# Patient Record
Sex: MALE | Race: WHITE | NOT HISPANIC OR LATINO | Employment: OTHER | URBAN - METROPOLITAN AREA
[De-identification: names, ages, dates, MRNs, and addresses within clinical notes are randomized per-mention and may not be internally consistent; named-entity substitution may affect disease eponyms.]

---

## 2020-07-06 ENCOUNTER — HOSPITAL ENCOUNTER (OUTPATIENT)
Facility: CLINIC | Age: 85
Setting detail: OBSERVATION
Discharge: HOME | End: 2020-07-08
Attending: EMERGENCY MEDICINE | Admitting: HOSPITALIST
Payer: COMMERCIAL

## 2020-07-06 ENCOUNTER — APPOINTMENT (OUTPATIENT)
Dept: GENERAL RADIOLOGY | Facility: CLINIC | Age: 85
End: 2020-07-06
Attending: EMERGENCY MEDICINE
Payer: COMMERCIAL

## 2020-07-06 DIAGNOSIS — R06.09 DYSPNEA ON EXERTION: ICD-10-CM

## 2020-07-06 DIAGNOSIS — I25.10 CORONARY ARTERY DISEASE INVOLVING NATIVE HEART WITHOUT ANGINA PECTORIS, UNSPECIFIED VESSEL OR LESION TYPE: Primary | ICD-10-CM

## 2020-07-06 DIAGNOSIS — R07.9 CHEST PAIN ON EXERTION: ICD-10-CM

## 2020-07-06 DIAGNOSIS — R60.0 BILATERAL LEG EDEMA: ICD-10-CM

## 2020-07-06 DIAGNOSIS — K21.9 GASTROESOPHAGEAL REFLUX DISEASE WITHOUT ESOPHAGITIS: ICD-10-CM

## 2020-07-06 LAB
ANION GAP SERPL CALCULATED.3IONS-SCNC: 5 MMOL/L (ref 3–14)
BASOPHILS # BLD AUTO: 0 10E9/L (ref 0–0.2)
BASOPHILS NFR BLD AUTO: 0.2 %
BUN SERPL-MCNC: 15 MG/DL (ref 7–30)
CALCIUM SERPL-MCNC: 8.2 MG/DL (ref 8.5–10.1)
CHLORIDE SERPL-SCNC: 109 MMOL/L (ref 94–109)
CO2 SERPL-SCNC: 27 MMOL/L (ref 20–32)
CREAT SERPL-MCNC: 0.88 MG/DL (ref 0.66–1.25)
D DIMER PPP FEU-MCNC: 0.3 UG/ML FEU (ref 0–0.5)
DIFFERENTIAL METHOD BLD: ABNORMAL
EOSINOPHIL # BLD AUTO: 0.2 10E9/L (ref 0–0.7)
EOSINOPHIL NFR BLD AUTO: 1.7 %
ERYTHROCYTE [DISTWIDTH] IN BLOOD BY AUTOMATED COUNT: 13 % (ref 10–15)
GFR SERPL CREATININE-BSD FRML MDRD: 78 ML/MIN/{1.73_M2}
GLUCOSE SERPL-MCNC: 90 MG/DL (ref 70–99)
HBA1C MFR BLD: 5.4 % (ref 0–5.6)
HCT VFR BLD AUTO: 42 % (ref 40–53)
HGB BLD-MCNC: 13.2 G/DL (ref 13.3–17.7)
IMM GRANULOCYTES # BLD: 0 10E9/L (ref 0–0.4)
IMM GRANULOCYTES NFR BLD: 0.2 %
LYMPHOCYTES # BLD AUTO: 1.2 10E9/L (ref 0.8–5.3)
LYMPHOCYTES NFR BLD AUTO: 13.8 %
MCH RBC QN AUTO: 29.6 PG (ref 26.5–33)
MCHC RBC AUTO-ENTMCNC: 31.4 G/DL (ref 31.5–36.5)
MCV RBC AUTO: 94 FL (ref 78–100)
MONOCYTES # BLD AUTO: 1.1 10E9/L (ref 0–1.3)
MONOCYTES NFR BLD AUTO: 12.2 %
NEUTROPHILS # BLD AUTO: 6.5 10E9/L (ref 1.6–8.3)
NEUTROPHILS NFR BLD AUTO: 71.9 %
NRBC # BLD AUTO: 0 10*3/UL
NRBC BLD AUTO-RTO: 0 /100
NT-PROBNP SERPL-MCNC: 222 PG/ML (ref 0–1800)
PLATELET # BLD AUTO: 167 10E9/L (ref 150–450)
POTASSIUM SERPL-SCNC: 4 MMOL/L (ref 3.4–5.3)
RBC # BLD AUTO: 4.46 10E12/L (ref 4.4–5.9)
SODIUM SERPL-SCNC: 141 MMOL/L (ref 133–144)
TROPONIN I SERPL-MCNC: <0.015 UG/L (ref 0–0.04)
TROPONIN I SERPL-MCNC: <0.015 UG/L (ref 0–0.04)
WBC # BLD AUTO: 9 10E9/L (ref 4–11)

## 2020-07-06 PROCEDURE — 80048 BASIC METABOLIC PNL TOTAL CA: CPT | Performed by: EMERGENCY MEDICINE

## 2020-07-06 PROCEDURE — 85025 COMPLETE CBC W/AUTO DIFF WBC: CPT | Performed by: EMERGENCY MEDICINE

## 2020-07-06 PROCEDURE — 99220 ZZC INITIAL OBSERVATION CARE,LEVL III: CPT | Performed by: PHYSICIAN ASSISTANT

## 2020-07-06 PROCEDURE — 84484 ASSAY OF TROPONIN QUANT: CPT | Performed by: EMERGENCY MEDICINE

## 2020-07-06 PROCEDURE — C9803 HOPD COVID-19 SPEC COLLECT: HCPCS

## 2020-07-06 PROCEDURE — 85379 FIBRIN DEGRADATION QUANT: CPT | Performed by: EMERGENCY MEDICINE

## 2020-07-06 PROCEDURE — U0003 INFECTIOUS AGENT DETECTION BY NUCLEIC ACID (DNA OR RNA); SEVERE ACUTE RESPIRATORY SYNDROME CORONAVIRUS 2 (SARS-COV-2) (CORONAVIRUS DISEASE [COVID-19]), AMPLIFIED PROBE TECHNIQUE, MAKING USE OF HIGH THROUGHPUT TECHNOLOGIES AS DESCRIBED BY CMS-2020-01-R: HCPCS | Performed by: EMERGENCY MEDICINE

## 2020-07-06 PROCEDURE — 93005 ELECTROCARDIOGRAM TRACING: CPT

## 2020-07-06 PROCEDURE — 83036 HEMOGLOBIN GLYCOSYLATED A1C: CPT | Performed by: EMERGENCY MEDICINE

## 2020-07-06 PROCEDURE — 71046 X-RAY EXAM CHEST 2 VIEWS: CPT

## 2020-07-06 PROCEDURE — 36415 COLL VENOUS BLD VENIPUNCTURE: CPT | Performed by: PHYSICIAN ASSISTANT

## 2020-07-06 PROCEDURE — 25000128 H RX IP 250 OP 636: Performed by: PHYSICIAN ASSISTANT

## 2020-07-06 PROCEDURE — 84484 ASSAY OF TROPONIN QUANT: CPT | Mod: 91 | Performed by: PHYSICIAN ASSISTANT

## 2020-07-06 PROCEDURE — 25000132 ZZH RX MED GY IP 250 OP 250 PS 637: Performed by: PHYSICIAN ASSISTANT

## 2020-07-06 PROCEDURE — 99285 EMERGENCY DEPT VISIT HI MDM: CPT | Mod: 25

## 2020-07-06 PROCEDURE — 83880 ASSAY OF NATRIURETIC PEPTIDE: CPT | Performed by: EMERGENCY MEDICINE

## 2020-07-06 PROCEDURE — 83036 HEMOGLOBIN GLYCOSYLATED A1C: CPT | Performed by: PHYSICIAN ASSISTANT

## 2020-07-06 PROCEDURE — 96374 THER/PROPH/DIAG INJ IV PUSH: CPT

## 2020-07-06 PROCEDURE — G0378 HOSPITAL OBSERVATION PER HR: HCPCS

## 2020-07-06 RX ORDER — ATORVASTATIN CALCIUM 80 MG/1
80 TABLET, FILM COATED ORAL AT BEDTIME
COMMUNITY
End: 2021-03-01

## 2020-07-06 RX ORDER — ACETAMINOPHEN 325 MG/1
650 TABLET ORAL EVERY 4 HOURS PRN
Status: DISCONTINUED | OUTPATIENT
Start: 2020-07-06 | End: 2020-07-06

## 2020-07-06 RX ORDER — ALUMINA, MAGNESIA, AND SIMETHICONE 2400; 2400; 240 MG/30ML; MG/30ML; MG/30ML
30 SUSPENSION ORAL EVERY 4 HOURS PRN
Status: DISCONTINUED | OUTPATIENT
Start: 2020-07-06 | End: 2020-07-08 | Stop reason: HOSPADM

## 2020-07-06 RX ORDER — ACETAMINOPHEN 325 MG/1
975 TABLET ORAL EVERY 8 HOURS PRN
COMMUNITY
End: 2024-07-29

## 2020-07-06 RX ORDER — FUROSEMIDE 10 MG/ML
20 INJECTION INTRAMUSCULAR; INTRAVENOUS ONCE
Status: COMPLETED | OUTPATIENT
Start: 2020-07-06 | End: 2020-07-06

## 2020-07-06 RX ORDER — SERTRALINE HYDROCHLORIDE 100 MG/1
100 TABLET, FILM COATED ORAL DAILY
Status: DISCONTINUED | OUTPATIENT
Start: 2020-07-07 | End: 2020-07-08 | Stop reason: HOSPADM

## 2020-07-06 RX ORDER — LOSARTAN POTASSIUM 25 MG/1
25 TABLET ORAL DAILY
Status: DISCONTINUED | OUTPATIENT
Start: 2020-07-06 | End: 2020-07-06

## 2020-07-06 RX ORDER — ASPIRIN 81 MG/1
81 TABLET ORAL DAILY
Status: DISCONTINUED | OUTPATIENT
Start: 2020-07-06 | End: 2020-07-06

## 2020-07-06 RX ORDER — GABAPENTIN 300 MG/1
300 CAPSULE ORAL 2 TIMES DAILY
Status: DISCONTINUED | OUTPATIENT
Start: 2020-07-06 | End: 2020-07-06

## 2020-07-06 RX ORDER — NITROGLYCERIN 0.4 MG/1
0.4 TABLET SUBLINGUAL EVERY 5 MIN PRN
Status: DISCONTINUED | OUTPATIENT
Start: 2020-07-06 | End: 2020-07-08 | Stop reason: HOSPADM

## 2020-07-06 RX ORDER — HYDROMORPHONE HYDROCHLORIDE 1 MG/ML
0.2 INJECTION, SOLUTION INTRAMUSCULAR; INTRAVENOUS; SUBCUTANEOUS
Status: DISCONTINUED | OUTPATIENT
Start: 2020-07-06 | End: 2020-07-07

## 2020-07-06 RX ORDER — ATORVASTATIN CALCIUM 40 MG/1
80 TABLET, FILM COATED ORAL AT BEDTIME
Status: DISCONTINUED | OUTPATIENT
Start: 2020-07-06 | End: 2020-07-08 | Stop reason: HOSPADM

## 2020-07-06 RX ORDER — ASPIRIN 81 MG/1
81 TABLET ORAL DAILY
Status: DISCONTINUED | OUTPATIENT
Start: 2020-07-07 | End: 2020-07-06

## 2020-07-06 RX ORDER — ACETAMINOPHEN 325 MG/1
975 TABLET ORAL EVERY 8 HOURS PRN
Status: DISCONTINUED | OUTPATIENT
Start: 2020-07-06 | End: 2020-07-08 | Stop reason: HOSPADM

## 2020-07-06 RX ORDER — ACETAMINOPHEN 325 MG/1
975 TABLET ORAL 3 TIMES DAILY
Status: DISCONTINUED | OUTPATIENT
Start: 2020-07-06 | End: 2020-07-06

## 2020-07-06 RX ORDER — NALOXONE HYDROCHLORIDE 0.4 MG/ML
.1-.4 INJECTION, SOLUTION INTRAMUSCULAR; INTRAVENOUS; SUBCUTANEOUS
Status: DISCONTINUED | OUTPATIENT
Start: 2020-07-06 | End: 2020-07-08 | Stop reason: HOSPADM

## 2020-07-06 RX ORDER — HYDRALAZINE HYDROCHLORIDE 20 MG/ML
10 INJECTION INTRAMUSCULAR; INTRAVENOUS EVERY 4 HOURS PRN
Status: DISCONTINUED | OUTPATIENT
Start: 2020-07-06 | End: 2020-07-08 | Stop reason: HOSPADM

## 2020-07-06 RX ORDER — ATORVASTATIN CALCIUM 40 MG/1
80 TABLET, FILM COATED ORAL AT BEDTIME
Status: DISCONTINUED | OUTPATIENT
Start: 2020-07-06 | End: 2020-07-06

## 2020-07-06 RX ADMIN — FUROSEMIDE 20 MG: 10 INJECTION, SOLUTION INTRAMUSCULAR; INTRAVENOUS at 22:11

## 2020-07-06 RX ADMIN — ATORVASTATIN CALCIUM 80 MG: 40 TABLET, FILM COATED ORAL at 21:03

## 2020-07-06 RX ADMIN — RIVAROXABAN 20 MG: 10 TABLET, FILM COATED ORAL at 18:51

## 2020-07-06 ASSESSMENT — ENCOUNTER SYMPTOMS
BLOOD IN STOOL: 0
COUGH: 0
SHORTNESS OF BREATH: 1
FEVER: 0

## 2020-07-06 NOTE — H&P
"[facility]  History and Physical Exam     Seth Mcdaniels MRN# [id number]   YOB: 1935 Age: 84 year old      Date of Admission:  7/6/2020    Primary care provider: [facility]          Assessment:   Seth Mcdaniels is a 84 year old male with a PMH significant for KALEIGH, HTN, pAfib, CVA on Xarelto and no significant hx of CAD (last Lexiscan, showed fixed inferolateral defect w/o ischemia though it was a challenging study) who presents with 10 day hx of chest discomfort that worsens with activity. Describes as a heaviness or a \"punch\" into the substernal chest region and SOB with increased activity and then improves with rest. This has been going on and off for >10 days so he decided to come for further eval.    Work up in ED reveals: normal troponins and no obvious ischemic changes on EKG  Patient is being registered to observation for further evaluation and to rule out possible ACS.     1.  Exertional chest pain: Fortunately negative troponins, so no acute MI, however his story is somewhat concerning for increasing exertional chest pain and shortness of breath that certainly could represent ischemia.  His chest x-ray and BNP are within normal limits, so less suggestive of heart failure.  --Currently he is pain-free at rest  --We will follow 1 more troponin to ensure that is not rising  --Continue his usual aspirin for now  --Will undergo Lexiscan tomorrow for further re-stratification    2.  Elevated blood pressure on arrival.  Ap while he wants to go home now all okay what he say.  Would you mind doing sending 20 600 sorry 26 2 Vaughn gonzalez is parent remote history of hypertension  --Previously patient had been treated with losartan but has since discontinue due to satisfactory BP     3.  Hyperlipidemia-resume Lipitor, check lipid panel in the morning    4.  History of CVA on Xarelto  --No recent reoccurrence.   --Note, I did find 1 documentation that state he had " "paroxysmal A. fib but not on no other chart review.  Patient also denies any history of A. Fib    5.  Obstructive sleep apnea-patient admits that he is noncompliant with CPAP  --Chest pain shortness of breath may be related to untreated sleep apnea possible increased pulmonary hypertension.   --We will wait and see what stress test shows, if unremarkable then may consider doing outpatient echocardiogram to assess pulmonary hypertension and or pulmonary function testing    6.  Peripheral vascular disease-patient has poor circulation.   --Evidence of chronic venous stasis and at least 1+ pitting edema in bilateral ankles  --We will give 20 mg IV Lasix now but I suspect he might do well with oral daily Lasix as outpatient  --Encourage elevation as much as possible  --Recommend outpatient compression stocking         Plan:     1. [place] to Observation  2. Continue telemetry  3. Follow serial troponins, check fasting lipids   4. Stress testing with Lexiscan  5. Cont Xarelto and ASA   6. Blood pressure sl elevated on admission, will add PRN hydralazine   7. Morphine and nitroglycerine PRN for pain    8. Cardiac diet, No caffeine if Nuclear testing selected  9. DVT prophylaxis: pt at low risk, encourage ambulation  10. Code Status: Full  11. Dispo: Home                   Chief Complaint:   Chest Pain         History of Present Illness:   Seth Mcdaniels is a 84 year old male with a PMH significant for KALEIGH, HTN, pAfib, CVA on Xarelto and no significant hx of CAD (last Lexiscan, showed fixed inferolateral defect w/o ischemia though it was a challenging study) who presents with 10 day hx of chest discomfort that worsens with activity. Describes as a heaviness or a \"punch\" into the substernal chest region and SOB with increased activity and then improves with rest. This has been going on and off for >10 days.  Patient states that he tries to be fairly active and was working as a  up till 6 months ago.  He noticed that " he would be a little bit more short of breath and a little heavy in his chest when he exerts himself.  It does improve when he rests.  He denies any nausea vomiting, no palpitations no lightheadedness or dizziness.  He also denies any recent illness. Work up in ED reveals: normal troponins and no obvious ischemic changes on EKG. Patient is being registered to observation for further evaluation and to rule out possible ACS.              Past Medical History:     Past Medical History:   Diagnosis Date     Depressive disorder, not elsewhere classified      History of tension headache      Hypertension      Obesity, unspecified      Obstructive sleep apnea (adult) (pediatric)--does not use CPAP      Osteoarthritis      Unspecified cerebral artery occlusion with cerebral infarction     no residual           Past Surgical History:     Past Surgical History:   Procedure Laterality Date     ARTHROPLASTY KNEE Right 7/17/2015    Procedure: ARTHROPLASTY KNEE;  Surgeon: Jarod Jaime MD;  Location:  OR     COLONOSCOPY      2003     PHACOEMULSIFICATION CLEAR CORNEA WITH STANDARD INTRAOCULAR LENS IMPLANT  11/29/2012    Procedure: PHACOEMULSIFICATION CLEAR CORNEA WITH STANDARD INTRAOCULAR LENS IMPLANT;  RIGHT EYE PHACOEMULSIFICATION CLEAR CORNEA WITH STANDARD INTRAOCULAR LENS IMPLANT ;  Surgeon: Cody Salazar MD;  Location: [facility]     PHACOEMULSIFICATION CLEAR CORNEA WITH TORIC INTRAOCULAR LENS IMPLANT  9/27/2012    Procedure: PHACOEMULSIFICATION CLEAR CORNEA WITH TORIC INTRAOCULAR LENS IMPLANT;  LEFT PHACOEMULSIFICAITON CLEAR CORNEA WITH  CHARLIE TORIC  INTRAOCULAR LENS IMPLANT ;  Surgeon: Cody Salazar MD;  Location: [facility]           Social History:     Social History     Socioeconomic History     Marital status:      Spouse name: Not on file     Number of children: Not on file     Years of education: Not on file     Highest education level: Not on file   Occupational History     Not on file   Social  Needs     Financial resource strain: Not on file     Food insecurity     Worry: Not on file     Inability: Not on file     Transportation needs     Medical: Not on file     Non-medical: Not on file   Tobacco Use     Smoking status: Never Smoker     Smokeless tobacco: Current User     Types: Chew   Substance and Sexual Activity     Alcohol use: Yes     Comment: rare     Drug use: No     Sexual activity: Not on file   Lifestyle     Physical activity     Days per week: Not on file     Minutes per session: Not on file     Stress: Not on file   Relationships     Social connections     Talks on phone: Not on file     Gets together: Not on file     Attends Judaism service: Not on file     Active member of club or organization: Not on file     Attends meetings of clubs or organizations: Not on file     Relationship status: Not on file     Intimate partner violence     Fear of current or ex partner: Not on file     Emotionally abused: Not on file     Physically abused: Not on file     Forced sexual activity: Not on file   Other Topics Concern     Not on file   Social History Narrative     Not on file             Family History:   Reviewed and non contributory         Allergies:      Allergies   Allergen Reactions     Lisinopril Cough             Medications:     Prior to Admission medications    Medication Sig Last Dose Taking? Auth Provider   acetaminophen (TYLENOL) 325 MG tablet Take 975 mg by mouth every 8 hours as needed for mild pain prn Yes Unknown, Entered By History   atorvastatin (LIPITOR) 80 MG tablet Take 80 mg by mouth At Bedtime 7/5/2020 at Unknown time Yes Unknown, Entered By History   rivaroxaban ANTICOAGULANT (XARELTO) 20 MG TABS tablet Take 1 tablet (20 mg) by mouth daily (with dinner) 7/5/2020 at Unknown time Yes Khoa Diaz MD   SERTRALINE HCL PO Take 100 mg by mouth every morning  7/6/2020 at Unknown time Yes Reported, Patient              Review of Systems:   A Comprehensive greater than  "10 system review of systems was carried out.  Pertinent positives and negatives are noted above.  Otherwise negative for contributory information.            Physical Exam:   Blood pressure (!) 161/58, pulse 63, temperature 95.4  F (35.2  C), temperature source Oral, resp. rate 18, height 1.753 m (5' 9\"), weight (!) 163 kg (359 lb 5.6 oz), SpO2 95 %.    GENERAL: healthy, alert and no distress, OBESE  EYES: Eyes grossly normal to inspection, extraocular movements - intact, and PERRL  HENT: ear canals- normal; TMs- normal; Nose- normal; Mouth- no ulcers, no lesions  NECK: no tenderness, no adenopathy, no asymmetry, no masses, no stiffness; thyroid- normal to palpation  RESP: lungs clear to auscultation - no rales, no rhonchi, no wheezes  CV: distant heart sounds, regular rates and rhythm, normal S1 S2, no S3 or S4 and no murmur, click or rub  ABDOMEN: soft, no tenderness, no  hepatosplenomegaly, no masses, normal bowel sounds  MS: extremities- no gross deformities noted, no edema  SKIN: no suspicious lesions, no rashes  NEURO: strength and tone- normal, sensory exam- grossly normal, mentation- intact, speech- normal, reflexes- symmetric  PSYCH: Alert and oriented times 3; coherent speech. Affect is normal.            Data:     EKG demonstrates:   Vent. Rate 79 bpm. VA interval 284. QRS duration 120. QT/QTc 400/458. P-R-T axis 4 -62 25. Sinus rhythm with 1st degree AV block. Low voltage QRS. Right bundle branch block. Left anterior fascicular block. Bifascicular block. Cannot rule out inferior infarct (masked by fascicular block). Abnormal ECG. Read time: 1438    Recent Labs   Lab 07/06/20  1428   WBC 9.0   HGB 13.2*   HCT 42.0   MCV 94        Recent Labs   Lab 07/06/20  1428      POTASSIUM 4.0   CHLORIDE 109   CO2 27   ANIONGAP 5   GLC 90   BUN 15   CR 0.88   GFRESTIMATED 78   GFRESTBLACK >90   RAMILA 8.2*       Recent Labs   Lab 07/06/20  1829 07/06/20  1428   TROPI <0.015 <0.015       Results for orders " placed or performed during the hospital encounter of 07/06/20   Chest XR,  PA & LAT    Narrative    XR CHEST 2 VW  7/6/2020 4:14 PM       INDICATION: chest pain  COMPARISON: 7/19/2015       Impression    IMPRESSION: Negative chest.    MD Vaishali GASPAR, PA-C PA-C

## 2020-07-06 NOTE — ED PROVIDER NOTES
"  History     Chief Complaint:  Chest Pain       The history is provided by the patient.      Seth Mcdaniels is a 84 year old male anticoagulated on Xarelto with a history of hypertension and cerebral artery occlusion with cerebral infarction who presents for an evaluation of chest pain sent from clinic. He notes that his chest pain has been ongoing for more than 10 days. The patient also has dyspnea with exertion, bilateral lower extremity edema and has gained 7 lbs in the last week. He also notes that he had some varying stools recently with an increased amount of gas and was concerned for this. He states that he has not changed his diet recently. The patient denies any fever, bloody or black stools, cough, current chest pain or shortness of breath. He notes that he has had ongoing leg swelling for \"quite some time.\" he states that he was sent from clinic as \"[he] was just not feeling good.\"      Allergies:  Lisinopril     Medications:    Aspirin  Atorvastatin  Gabapentin  Losartan potassium  Miralax  Xarelto  Sertraline HCl    Past Medical History:    Depressive disorder, not elsewhere classified   Hypertension   Obesity, unspecified   Obstructive sleep apnea  Osteoarthritis   Unspecified cerebral artery occlusion with cerebral infarction     Past Surgical History:    Arthroplasty knee, right  Colonoscopy  Phacoemulsification clear cornea with standard intraocular lens implant  Phacoemulsification clear cornea with toric intraocular lens implant    Family History:    History reviewed. No pertinent family history.    Social History:  The patient presents to the ED alone.  Smoking Status: Never Smoker  Smokeless Tobacco: Current User, Chew  Alcohol Use: Yes  Drug Use: No0  PCP: Braulio Waldron     Review of Systems   Constitutional: Negative for fever.   Respiratory: Positive for shortness of breath. Negative for cough.    Cardiovascular: Positive for chest pain (earlier today).   Gastrointestinal: Negative for " blood in stool.   All other systems reviewed and are negative.      Physical Exam     Patient Vitals for the past 24 hrs:   BP Temp Temp src Pulse Heart Rate Resp SpO2 Weight   07/06/20 1701 135/77 -- -- 65 -- -- -- --   07/06/20 1657 -- -- -- -- -- -- 98 % --   07/06/20 1552 -- -- -- -- 75 15 97 % --   07/06/20 1500 117/69 -- -- 71 77 10 96 % --   07/06/20 1416 -- -- -- -- -- -- -- (!) 163 kg (359 lb 5.6 oz)   07/06/20 1415 (!) 146/71 97.9  F (36.6  C) Oral -- 84 18 98 % --       Physical Exam  Nursing note and vitals reviewed.  Constitutional: Cooperative.   HENT:   Mouth/Throat: Moist mucous membranes.   Eyes: EOMI, nonicteric sclera  Cardiovascular: Normal rate, regular rhythm, no murmurs, rubs, or gallops  Pulmonary/Chest: Effort normal and breath sounds normal. No respiratory distress. No wheezes. No rales.   Abdominal: Soft. Nontender, nondistended, no guarding or rigidity.   Musculoskeletal: Normal range of motion.   Neurological: Alert. Moves all extremities spontaneously.   Skin: Skin is warm and dry. No rash noted.   Psychiatric: Normal mood and affect.       Emergency Department Course     ECG:  Indication: Chest Pain  Time: 1425  Vent. Rate 79 bpm. [place] interval 284. QRS duration 120. QT/QTc 400/458. P-R-T axis 4 -62 25. Sinus rhythm with 1st degree AV block. Low voltage QRS. Right bundle branch block. Left anterior fascicular block. Bifascicular block. Cannot rule out inferior infarct (masked by fascicular block). Abnormal ECG. Read time: 1438      Imaging:  Radiology findings were communicated with the patient who voiced understanding of the findings.    XR Chest 2 views:   Negative chest.  As per radiology.  Imaging independently reviewed and agree with radiologist interpretation.      Laboratory:  Laboratory findings were communicated with the patient who voiced understanding of the findings.    CBC: WBC: 9.0, HGB: 13.2 (low), PLT: 167    BMP: Calcium 8.2 (low), o/w WNL (Creatinine:  0.88)    1428 Troponin: <0.015     BNP: 222    D-dimer quantitative: 0.3     Asymptomatic COVID-19 Virus (Coronavirus) PCR: Pending     Emergency Department Course:  Past medical records, nursing notes, and vitals reviewed.    1435 I performed an exam of the patient as documented above.     EKG obtained in the ED, see results above.     IV was inserted and blood was drawn for laboratory testing, results above.    The patient was sent for a chest x-ray while in the emergency department, results above.     1641 I rechecked the patient and discussed the results of his workup thus far. At this point I recommended admission for serial troponins and observation and the patient consented.     1707 I consulted with JONNIE Silva for Dr. Salmon of the hospitalist service regarding the patient's history and presentation here in the emergency department who accepted the patient for admission.    Findings and plan explained to the patient who consents to admission. Discussed the patient with JONNIE Silva who will admit the patient to an observation bed for further monitoring, evaluation, and treatment.    I personally reviewed the laboratory and imaging results with the patient and answered all related questions prior to admission.     Impression & Plan   Covid-19  Seth Mcdaniels was evaluated during a global COVID-19 pandemic, which necessitated consideration that the patient might be at risk for infection with the SARS-CoV-2 virus that causes COVID-19.   Applicable protocols for evaluation were followed during the patient's care.   COVID-19 was considered as part of the patient's evaluation. The plan for testing is: a test was obtained during this visit.      Medical Decision Making:  Patient presents with chief complaint of chest pain and dyspnea on exertion.  He is currently symptom-free as he is currently at rest.  Symptoms have been getting worse over the last week prompting his arrival in ED today.  His EKG does  not appear significantly different compared to prior.  A troponin is within the normal range.  BNP also is normal.  Chest x-ray negative for acute etiology.  D-dimer is also negative ruling out PE.  Differential at this time would include new onset heart failure versus CAD versus deconditioning versus other.  Given patient's significant risk factors, I believe admission to the hospital is indicated for further evaluation which he is in agreement with. Vaishali Silva PA-C, from our hospital service accepts patient for admission.  All patient's questions were answered.    Diagnosis:    ICD-10-CM    1. Chest pain on exertion  R07.9 Asymptomatic COVID-19 Virus (Coronavirus) by PCR   2. Dyspnea on exertion  R06.00        Disposition:  Admitted to Dr. Salmon.    Scribe Disclosure:  I, Carter Reaves, am serving as a scribe at 2:28 PM on 7/6/2020 to document services personally performed by Julio César Veras MD based on my observations and the provider's statements to me.      Julio César Veras MD  07/06/20 1826

## 2020-07-06 NOTE — ED NOTES
RECEIVING UNIT ED HANDOFF REVIEW    Above ED Nurse Handoff Report was reviewed: Yes  Reviewed by: Estephania Gold RN on July 6, 2020 at 5:19 PM         [facility]  ED Nurse Handoff Report    Seth Mcdaniels is a 84 year old male   ED Chief complaint: Chest Pain  . ED Diagnosis:   Final diagnoses:   Chest pain on exertion   Dyspnea on exertion     Allergies:   Allergies   Allergen Reactions     Lisinopril Cough       Code Status: Full Code  Activity level - Baseline/Home:  Independent. Activity Level - Current:   Assist X 1. Lift room needed: No. Bariatric: No   Needed: No   Isolation: No. Infection: Not Applicable.     Vital Signs:   Vitals:    07/06/20 1500 07/06/20 1552 07/06/20 1657 07/06/20 1701   BP: 117/69   135/77   Pulse: 71   65   Resp: 10 15     Temp:       TempSrc:       SpO2: 96% 97% 98%    Weight:           Cardiac Rhythm:  ,   Cardiac  Cardiac Rhythm: Normal sinus rhythm(1 degree block)  Pain level: 0-10 Pain Scale: 1  Patient confused: No. Patient Falls Risk: Yes.   Elimination Status: Has voided   Patient Report - Initial Complaint: Chest Pain, Leg Swelling. Focused Assessment: Patient arrives with a few days of intermittent, midsternal chest pain and associated dyspnea with exertion. Pain relieved while in ER. Bilateral lower extremity edema noted.  Tests Performed: EKG, Lab work, X ray. Abnormal Results:   Labs Ordered and Resulted from Time of ED Arrival Up to the Time of Departure from the ED   CBC WITH PLATELETS DIFFERENTIAL - Abnormal; Notable for the following components:       Result Value    Hemoglobin 13.2 (*)     MCHC 31.4 (*)     All other components within normal limits   BASIC METABOLIC PANEL - Abnormal; Notable for the following components:    Calcium 8.2 (*)     All other components within normal limits   TROPONIN I   NT PROBNP INPATIENT   D DIMER QUANTITATIVE   COVID-19 VIRUS (CORONAVIRUS) BY PCR     Chest XR,  PA & LAT   Final Result   IMPRESSION:  Negative chest.      MEGAN FRANK MD        .   Treatments provided: Observation, Reduction of activity  Family Comments: N/A  OBS brochure/video discussed/provided to patient:  Yes  ED Medications: Medications - No data to display  Drips infusing:  No  For the majority of the shift, the patient's behavior Green. Interventions performed were  N/A.    Sepsis treatment initiated: No       ED Nurse Name/Phone Number: Darline Feng RN,   5:02 PM

## 2020-07-06 NOTE — PLAN OF CARE
PRIMARY DIAGNOSIS: CHEST PAIN  OUTPATIENT/OBSERVATION GOALS TO BE MET BEFORE DISCHARGE:  1. Ruled out acute coronary syndrome (negative or stable Troponin):  Yes  2. Pain Status: Improved with use of alternative comfort measures i.e.: positioning  3. Appropriate provocative testing performed: Yes  - Stress Test Procedure: Lesiscan  - Interpretation of cardiac rhythm per telemetry tech: awaiting tele box    4. Cleared by Consultants (if applicable):N/A  5. Return to near baseline physical activity: Yes  Discharge Planner Nurse   Safe discharge environment identified: Yes  Barriers to discharge: No       Entered by: Estephania Gold 07/06/2020 6:33 PM     Please review provider order for any additional goals.   Nurse to notify provider when observation goals have been met and patient is ready for discharge.  Up with SBA with cane, A&Ox4, cardiac diet - no caffeine, reports midsternal chest pain, LS diminished, room air, CRUZ, BS A&Ax4, passing gas, júnior LE libertad, CMS intact, plan for trop draw at 1830, NPO at midnight, lexiscan tomorrow, will continue to monitor and provide supportive cares.

## 2020-07-06 NOTE — PLAN OF CARE
"ROOM # 208-2    Living Situation (if not independent, order SW consult): lives with his \"lady friend\" in a house  Facility name: N/A  : daughter Pedro Salmon (\"lady friend\") [phone]     Activity level at baseline: ind with cane   Activity level on admit: SBA with cane      Patient registered to observation; given Patient Bill of Rights; given the opportunity to ask questions about observation status and their plan of care.  Patient has been oriented to the observation room, bathroom and call light is in place.    Discussed discharge goals and expectations with patient/family.           "

## 2020-07-06 NOTE — ED TRIAGE NOTES
Pt arrives from clinic for chest pain, dyspnea with exertion, 7lb weight gain in 1 week, and BLE edema. Denies fever, cough, or any current chest pain or SOB. ABCs intact.

## 2020-07-06 NOTE — PHARMACY-ADMISSION MEDICATION HISTORY
Admission medication history interview status for this patient is complete. See [facility] admission navigator for allergy information, prior to admission medications and immunization status.     Medication history interview done via telephone during Covid-19 pandemic, indicate source(s): Patient and RN in [facility] inpatient room  Medication history resources (including written lists, pill bottles, clinic record):pill bottles    Changes made to PTA medication list:  Added: none  Deleted: asa, losartan, oxycodone, miralax, blink tears  Changed: APAP, lipitor    Actions taken by pharmacist (provider contacted, etc):None     Additional medication history information:RN in room helped read off the rx bottles    Medication reconciliation/reorder completed by provider prior to medication history?  n   (Y/N)           Prior to Admission medications    Medication Sig Last Dose Taking? Auth Provider   acetaminophen (TYLENOL) 325 MG tablet Take 975 mg by mouth every 8 hours as needed for mild pain prn Yes Unknown, Entered By History   atorvastatin (LIPITOR) 80 MG tablet Take 80 mg by mouth At Bedtime 7/5/2020 at Unknown time Yes Unknown, Entered By History   rivaroxaban ANTICOAGULANT (XARELTO) 20 MG TABS tablet Take 1 tablet (20 mg) by mouth daily (with dinner) 7/5/2020 at Unknown time Yes Khoa Diaz MD   SERTRALINE HCL PO Take 100 mg by mouth every morning  7/6/2020 at Unknown time Yes Reported, Patient

## 2020-07-07 ENCOUNTER — APPOINTMENT (OUTPATIENT)
Dept: CARDIOLOGY | Facility: CLINIC | Age: 85
End: 2020-07-07
Attending: PHYSICIAN ASSISTANT
Payer: COMMERCIAL

## 2020-07-07 ENCOUNTER — APPOINTMENT (OUTPATIENT)
Dept: NUCLEAR MEDICINE | Facility: CLINIC | Age: 85
End: 2020-07-07
Attending: PHYSICIAN ASSISTANT
Payer: COMMERCIAL

## 2020-07-07 LAB
CHOLEST SERPL-MCNC: 131 MG/DL
HDLC SERPL-MCNC: 41 MG/DL
INTERPRETATION ECG - MUSE: NORMAL
LDLC SERPL CALC-MCNC: 73 MG/DL
NONHDLC SERPL-MCNC: 90 MG/DL
SARS-COV-2 RNA SPEC QL NAA+PROBE: NOT DETECTED
SPECIMEN SOURCE: NORMAL
TRIGL SERPL-MCNC: 87 MG/DL
TROPONIN I SERPL-MCNC: <0.015 UG/L (ref 0–0.04)

## 2020-07-07 PROCEDURE — 78452 HT MUSCLE IMAGE SPECT MULT: CPT

## 2020-07-07 PROCEDURE — 80061 LIPID PANEL: CPT | Performed by: PHYSICIAN ASSISTANT

## 2020-07-07 PROCEDURE — 25000132 ZZH RX MED GY IP 250 OP 250 PS 637: Performed by: PHYSICIAN ASSISTANT

## 2020-07-07 PROCEDURE — 40000065 ZZH STATISTIC EKG NON-CHARGEABLE

## 2020-07-07 PROCEDURE — 25000128 H RX IP 250 OP 636: Performed by: PHYSICIAN ASSISTANT

## 2020-07-07 PROCEDURE — 25000128 H RX IP 250 OP 636

## 2020-07-07 PROCEDURE — 34300033 ZZH RX 343: Performed by: HOSPITALIST

## 2020-07-07 PROCEDURE — 93005 ELECTROCARDIOGRAM TRACING: CPT

## 2020-07-07 PROCEDURE — G0378 HOSPITAL OBSERVATION PER HR: HCPCS

## 2020-07-07 PROCEDURE — 84484 ASSAY OF TROPONIN QUANT: CPT | Performed by: PHYSICIAN ASSISTANT

## 2020-07-07 PROCEDURE — 36415 COLL VENOUS BLD VENIPUNCTURE: CPT | Performed by: PHYSICIAN ASSISTANT

## 2020-07-07 PROCEDURE — 93017 CV STRESS TEST TRACING ONLY: CPT

## 2020-07-07 PROCEDURE — 99226 ZZC SUBSEQUENT OBSERVATION CARE,LEVEL III: CPT | Performed by: PHYSICIAN ASSISTANT

## 2020-07-07 PROCEDURE — A9502 TC99M TETROFOSMIN: HCPCS | Performed by: HOSPITALIST

## 2020-07-07 PROCEDURE — 25000125 ZZHC RX 250: Performed by: PHYSICIAN ASSISTANT

## 2020-07-07 PROCEDURE — 93010 ELECTROCARDIOGRAM REPORT: CPT | Performed by: INTERNAL MEDICINE

## 2020-07-07 PROCEDURE — C9113 INJ PANTOPRAZOLE SODIUM, VIA: HCPCS | Performed by: PHYSICIAN ASSISTANT

## 2020-07-07 RX ORDER — MORPHINE SULFATE 2 MG/ML
1-2 INJECTION, SOLUTION INTRAMUSCULAR; INTRAVENOUS
Status: DISCONTINUED | OUTPATIENT
Start: 2020-07-07 | End: 2020-07-08 | Stop reason: HOSPADM

## 2020-07-07 RX ORDER — ONDANSETRON 4 MG/1
4 TABLET, ORALLY DISINTEGRATING ORAL EVERY 6 HOURS PRN
Status: DISCONTINUED | OUTPATIENT
Start: 2020-07-07 | End: 2020-07-08 | Stop reason: HOSPADM

## 2020-07-07 RX ORDER — ONDANSETRON 2 MG/ML
4 INJECTION INTRAMUSCULAR; INTRAVENOUS EVERY 6 HOURS PRN
Status: DISCONTINUED | OUTPATIENT
Start: 2020-07-07 | End: 2020-07-08 | Stop reason: HOSPADM

## 2020-07-07 RX ORDER — REGADENOSON 0.08 MG/ML
INJECTION, SOLUTION INTRAVENOUS
Status: COMPLETED
Start: 2020-07-07 | End: 2020-07-07

## 2020-07-07 RX ORDER — FUROSEMIDE 10 MG/ML
20 INJECTION INTRAMUSCULAR; INTRAVENOUS ONCE
Status: COMPLETED | OUTPATIENT
Start: 2020-07-07 | End: 2020-07-07

## 2020-07-07 RX ADMIN — MORPHINE SULFATE 1 MG: 2 INJECTION, SOLUTION INTRAMUSCULAR; INTRAVENOUS at 13:33

## 2020-07-07 RX ADMIN — REGADENOSON 0.4 MG: 0.08 INJECTION, SOLUTION INTRAVENOUS at 13:02

## 2020-07-07 RX ADMIN — RIVAROXABAN 20 MG: 10 TABLET, FILM COATED ORAL at 17:06

## 2020-07-07 RX ADMIN — NITROGLYCERIN 0.4 MG: 0.4 TABLET SUBLINGUAL at 11:22

## 2020-07-07 RX ADMIN — FUROSEMIDE 20 MG: 10 INJECTION, SOLUTION INTRAMUSCULAR; INTRAVENOUS at 13:37

## 2020-07-07 RX ADMIN — NITROGLYCERIN 0.4 MG: 0.4 TABLET SUBLINGUAL at 09:39

## 2020-07-07 RX ADMIN — LIDOCAINE HYDROCHLORIDE 30 ML: 20 SOLUTION ORAL; TOPICAL at 09:59

## 2020-07-07 RX ADMIN — ATORVASTATIN CALCIUM 80 MG: 40 TABLET, FILM COATED ORAL at 21:59

## 2020-07-07 RX ADMIN — SERTRALINE HYDROCHLORIDE 100 MG: 100 TABLET ORAL at 08:33

## 2020-07-07 RX ADMIN — PANTOPRAZOLE SODIUM 40 MG: 40 INJECTION, POWDER, FOR SOLUTION INTRAVENOUS at 13:35

## 2020-07-07 RX ADMIN — Medication 33 MILLICURIE: at 13:10

## 2020-07-07 NOTE — PLAN OF CARE
PRIMARY DIAGNOSIS: CHEST PAIN  OUTPATIENT/OBSERVATION GOALS TO BE MET BEFORE DISCHARGE:  1. Ruled out acute coronary syndrome (negative or stable Troponin):  Trops neg x2  2. Pain Status: Pain free.  3. Appropriate provocative testing performed: Not yet   - Stress Test Procedure: Lexiscan  - Interpretation of cardiac rhythm per telemetry tech: SR with 2nd degree type 1 AVB    4. Cleared by Consultants (if applicable):N/A  5. Return to near baseline physical activity: Yes  Discharge Planner Nurse   Safe discharge environment identified: Yes  Barriers to discharge: Yes       Entered by: Susanne Iverson 07/07/2020 5:25 AM     VSS. Denies any chest pain or SOB. Independent in room. Trops neg x2. Plan for lexiscan in AM. Will continue to monitor.   Please review provider order for any additional goals.   Nurse to notify provider when observation goals have been met and patient is ready for discharge.

## 2020-07-07 NOTE — PLAN OF CARE
PRIMARY DIAGNOSIS: CHEST PAIN  OUTPATIENT/OBSERVATION GOALS TO BE MET BEFORE DISCHARGE:  1. Ruled out acute coronary syndrome (negative or stable Troponin):  Yes  2. Pain Status: Pain free.  3. Appropriate provocative testing performed: No  - Stress Test Procedure: Lesiscan  - Interpretation of cardiac rhythm per telemetry tech: SR with 1st degree ABV and BBB- 2250 notified by TT of 2* type 2 AVB intermittently noted.     4. Cleared by Consultants (if applicable):N/A  5. Return to near baseline physical activity: Yes  Discharge Planner Nurse   Safe discharge environment identified: Yes  Barriers to discharge: Yes       Entered by: Estee Hall 07/06/2020 10:37 PM     Please review provider order for any additional goals.   Nurse to notify provider when observation goals have been met and patient is ready for discharge.  Patient denies chest pain. Did c/o some more stools than he normally has, which has been going on for about 3 months. 20mg IV lasix given tonight as well.

## 2020-07-07 NOTE — PROGRESS NOTES
"[facility]    Hospitalist Progress Note  Name: Seth Mcdaniels    MRN: [id number]  Provider:  Marichuy Burgess PA-C  Date of Service: 07/07/2020    Assessment & Plan   Summary of Stay: Seth Mcdaniels is a 84 year old male with a PMH significant for KALEIGH, HTN, pAfib, CVA on Xarelto and no significant hx of CAD (last Lexiscan, showed fixed inferolateral defect w/o ischemia though it was a challenging study) who was admitted on 7/6/20 for a 10 day history of chest discomfort that worsens with activity.      #Exertional chest pain: describes as a heaviness or a \"punch\" into the substernal chest region and SOB with increased activity and then improves with rest. Serial troponins negative, initial EKG without ischemic changes. Chest x-ray and BNP WNL. Chest pain free on admission. Onset of central nonradiating chest pain this morning without associated shortness of breath but noted burning sensation. Repeat troponin negative, VSS, and EKG without new ischemic changes. Minimal improvement with two sublingual nitroglycerin and GI cocktail.   - Monitor on telemetry  - Continue ASA  - Continue with plan for 2 days Lexiscan to rule out ACS  - PRN IV Morphine for ongoing pain  - Trial dose of IV Protonix, symptoms this morning starting after taking his Sertraline, question GI source     #HTN: stable, not currently on medical management, continue to monitor     #HLD: lipid panel today WNL, continue PTA Atorvastatin     #H/o CVA: previously documented occurrence of paroxysmal A-fib, unknown to patient. Currently on Xarelto, continue.     #KALEIGH: noncompliant with CPAP, there could be an element of pulmonary hypertension contributing to patient's presenting chest pain and shortness of breath  - May benefit from outpatient echocardiogram     #PVD: evidence of chronic venous stasis of bilateral LE with 1+ pitting edema of feet and ankles. Received IV Lasix 20 mg on admission with minimal improvement.   - Trial additional dose " IV Lasix 20 mg  - Daily weights  - Monitor I&Os  - Elevated LE  - Likely benefit from outpatient compression stockings      DVT Prophylaxis: on Xarelto   Code Status: Full Code  Disposition: Expected discharge tomorrow pending improvement in chest pain and results of stress testing     Interval History   Patient reports onset of central nonradiating chest pain at 9:35 this morning that is 7-8/10 in severity without associated shortness of breath. He reports associated burning sensation. Denies nausea, vomiting, diaphoresis, lightheadedness, dizziness, or palpitations. No significant improvement in pain with sublingual nitroglycerin or GI cocktail. He believes he's had mild improvement in his LE edema overnight. No reproducible pain on exam.     -Data reviewed today: I reviewed all new labs and imaging reports over the last 24 hours.    Physical Exam   Temp: 97.2  F (36.2  C) Temp src: Oral BP: 130/64 Pulse: 63 Heart Rate: 65 Resp: 18 SpO2: 94 % O2 Device: None (Room air)    Vitals:    07/06/20 1416   Weight: (!) 163 kg (359 lb 5.6 oz)     Vital Signs with Ranges  Temp:  [95.4  F (35.2  C)-97.9  F (36.6  C)] 97.2  F (36.2  C)  Pulse:  [63-71] 63  Heart Rate:  [58-84] 65  Resp:  [10-18] 18  BP: (107-161)/(47-86) 130/64  SpO2:  [92 %-98 %] 94 %  No intake/output data recorded.    GEN:  Alert, oriented x 3, appears comfortable, NAD.  HEENT:  Normocephalic/atraumatic, no scleral icterus, no nasal discharge, mouth moist.  CV:  Distant heart sounds due to body habitus, regular rate and rhythm, no murmur or JVD.  S1 + S2 noted, no S3 or S4.  LUNGS:  Clear to auscultation bilaterally without rales/rhonchi/wheezing/retractions. Symmetric chest rise on inhalation noted.  ABD:  Active bowel sounds, soft, non-tender/non-distended.  No rebound/guarding/rigidity.  EXT:  No edema.  No cyanosis.  No acute joint synovitis noted.  SKIN:  Dry to touch, no exanthems noted in the visualized areas.    Medications       atorvastatin  80 mg  Oral At Bedtime     pantoprazole (PROTONIX) IV  40 mg Intravenous Daily with breakfast     rivaroxaban ANTICOAGULANT  20 mg Oral Daily with supper     sertraline  100 mg Oral Daily     sodium chloride (PF)  10 mL Intravenous Once     Data    EKG at 9:38 AM shows rate of 56 bpm in sinus bradycardia with 1st degree AV block with left axis deviation, RBBB.     Results for orders placed or performed during the hospital encounter of 07/06/20   Chest XR,  PA & LAT     Status: None    Narrative    XR CHEST 2 VW  7/6/2020 4:14 PM       INDICATION: chest pain  COMPARISON: 7/19/2015       Impression    IMPRESSION: Negative chest.    MEGAN FRANK MD   CBC with platelets differential     Status: Abnormal   Result Value Ref Range    WBC 9.0 4.0 - 11.0 10e9/L    RBC Count 4.46 4.4 - 5.9 10e12/L    Hemoglobin 13.2 (L) 13.3 - 17.7 g/dL    Hematocrit 42.0 40.0 - 53.0 %    MCV 94 78 - 100 fl    MCH 29.6 26.5 - 33.0 pg    MCHC 31.4 (L) 31.5 - 36.5 g/dL    RDW 13.0 10.0 - 15.0 %    Platelet Count 167 150 - 450 10e9/L    Diff Method Automated Method     % Neutrophils 71.9 %    % Lymphocytes 13.8 %    % Monocytes 12.2 %    % Eosinophils 1.7 %    % Basophils 0.2 %    % Immature Granulocytes 0.2 %    Nucleated RBCs 0 0 /100    Absolute Neutrophil 6.5 1.6 - 8.3 10e9/L    Absolute Lymphocytes 1.2 0.8 - 5.3 10e9/L    Absolute Monocytes 1.1 0.0 - 1.3 10e9/L    Absolute Eosinophils 0.2 0.0 - 0.7 10e9/L    Absolute Basophils 0.0 0.0 - 0.2 10e9/L    Abs Immature Granulocytes 0.0 0 - 0.4 10e9/L    Absolute Nucleated RBC 0.0    Troponin I     Status: None   Result Value Ref Range    Troponin I ES <0.015 0.000 - 0.045 ug/L   Basic metabolic panel     Status: Abnormal   Result Value Ref Range    Sodium 141 133 - 144 mmol/L    Potassium 4.0 3.4 - 5.3 mmol/L    Chloride 109 94 - 109 mmol/L    Carbon Dioxide 27 20 - 32 mmol/L    Anion Gap 5 3 - 14 mmol/L    Glucose 90 70 - 99 mg/dL    Urea Nitrogen 15 7 - 30 mg/dL    Creatinine 0.88 0.66 - 1.25 mg/dL     GFR Estimate 78 >60 mL/min/[1.73_m2]    GFR Estimate If Black >90 >60 mL/min/[1.73_m2]    Calcium 8.2 (L) 8.5 - 10.1 mg/dL   BNP     Status: None   Result Value Ref Range    N-Terminal Pro BNP Inpatient 222 0 - 1,800 pg/mL   D dimer quantitative     Status: None   Result Value Ref Range    D Dimer 0.3 0.0 - 0.50 ug/ml FEU   Troponin I     Status: None   Result Value Ref Range    Troponin I ES <0.015 0.000 - 0.045 ug/L   Hemoglobin A1c     Status: None   Result Value Ref Range    Hemoglobin A1C 5.4 0 - 5.6 %   Lipid Profile     Status: None   Result Value Ref Range    Cholesterol 131 <200 mg/dL    Triglycerides 87 <150 mg/dL    HDL Cholesterol 41 >39 mg/dL    LDL Cholesterol Calculated 73 <100 mg/dL    Non HDL Cholesterol 90 <130 mg/dL   Troponin I     Status: None   Result Value Ref Range    Troponin I ES <0.015 0.000 - 0.045 ug/L   EKG 12 lead     Status: None   Result Value Ref Range    Interpretation ECG Click View Image link to view waveform and result    EKG 12-lead, tracing only     Status: None (Preliminary result)   Result Value Ref Range    Interpretation ECG Click View Image link to view waveform and result      Marichuy Burgess PA-C

## 2020-07-07 NOTE — PLAN OF CARE
"Pt called at 0935 and reported 8/10 severe midsternal chest pain (\"feels like severe hunger pain\". States he was just given his morning med (sertraline) at 0833 and started noticing intense pain in his chest.   Tele rhythm - unchanged, SB with 1st degree AV block with occasional PVS and BBB.    EKG ordered.  Nitroglycerin given at 0939. No relief.  Trop drawn at 0959.  GI cocktail at 0959.  Provider notified.   "

## 2020-07-07 NOTE — PLAN OF CARE
"PRIMARY DIAGNOSIS: CHEST PAIN  OUTPATIENT/OBSERVATION GOALS TO BE MET BEFORE DISCHARGE:  1. Ruled out acute coronary syndrome (negative or stable Troponin):  Yes  2. Pain Status: Improved-controlled with oral pain medications.  3. Appropriate provocative testing performed: Yes  - Stress Test Procedure: Lexiscan, first day of two day test completed.    - Interpretation of cardiac rhythm per telemetry tech: SR, 1 and 2nd degree AV block Type I, HR 70s    4. Cleared by Consultants (if applicable):No  5. Return to near baseline physical activity: Yes  Blood pressure 125/72, pulse 67, temperature 96.4  F (35.8  C), temperature source Oral, resp. rate 18, height 1.753 m (5' 9\"), weight (!) 163 kg (359 lb 5.6 oz), SpO2 94 %.    VSS.  LS clear, diminished in bases.  Patient rates his mid-sternal chest discomfort a 1 on a 1-10 scale.  Patient tolerating his early evening meal tray without nausea or discomfort.  Patient ambulating in room with aid of cane, independently.  Plan:  Second day of two day Lexiscan planned for tomorrow.  Continue to provide supportive cares.    Discharge Planner Nurse   Safe discharge environment identified: Yes  Barriers to discharge: Yes, unless medically cleared.         Entered by: Karen M. Lesch 07/07/2020 16:00 PM     Please review provider order for any additional goals.   Nurse to notify provider when observation goals have been met and patient is ready for discharge.  "

## 2020-07-07 NOTE — PLAN OF CARE
PRIMARY DIAGNOSIS: CHEST PAIN  OUTPATIENT/OBSERVATION GOALS TO BE MET BEFORE DISCHARGE:  1. Ruled out acute coronary syndrome (negative or stable Troponin):  Yes  2. Pain Status: Improved with use of alternative comfort measures i.e.: positioning  3. Appropriate provocative testing performed: Yes  - Stress Test Procedure: Lesiscan  - Interpretation of cardiac rhythm per telemetry tech: sinus ever with 1st degree AV block, occasional PVCs and BBB    4. Cleared by Consultants (if applicable):No  5. Return to near baseline physical activity: Yes  Discharge Planner Nurse   Safe discharge environment identified: Yes  Barriers to discharge: Yes       Entered by: Estephania Gold 07/07/2020 8:43 AM     Please review provider order for any additional goals.   Nurse to notify provider when observation goals have been met and patient is ready for discharge.    Up ad moy, NPO, caffeine free, reports 1/10 chest pain, declining intervention, up in recliner, LS diminished, +2 edema in BLE, BLE libertad, CRUZ, plan for 2 day lexiscan, will continue to monitor and provide supportive cares.

## 2020-07-07 NOTE — PLAN OF CARE
PRIMARY DIAGNOSIS: CHEST PAIN  OUTPATIENT/OBSERVATION GOALS TO BE MET BEFORE DISCHARGE:  1. Ruled out acute coronary syndrome (negative or stable Troponin):  Yes  2. Pain Status: Improved with use of alternative comfort measures i.e.: positioning  3. Appropriate provocative testing performed: Yes  - Stress Test Procedure: Lesiscan  - Interpretation of cardiac rhythm per telemetry tech: sinus ever with 1st degree AV block, occasional PVCs and BBB     4. Cleared by Consultants (if applicable):No  5. Return to near baseline physical activity: Yes  Discharge Planner Nurse   Safe discharge environment identified: Yes  Barriers to discharge: Yes       Entered by: Estephania Gold 07/07/2020 12:00PM     Please review provider order for any additional goals.   Nurse to notify provider when observation goals have been met and patient is ready for discharge.     Reports pain - nitroglycerin given x2, GI cocktail, protonix and lasix ordered, morphine available and next if pain not controlled, VSS, tele unchanged, plan for lexiscan around 1PM, will continue to monitor and provide supportive cares.

## 2020-07-07 NOTE — PLAN OF CARE
PRIMARY DIAGNOSIS: CHEST PAIN  OUTPATIENT/OBSERVATION GOALS TO BE MET BEFORE DISCHARGE:  1. Ruled out acute coronary syndrome (negative or stable Troponin):  Trops neg x2  2. Pain Status: Pain free.  3. Appropriate provocative testing performed: Not yet   - Stress Test Procedure: Lexiscan  - Interpretation of cardiac rhythm per telemetry tech: SR with 2nd degree type 1 AVB    4. Cleared by Consultants (if applicable):N/A  5. Return to near baseline physical activity: Yes  Discharge Planner Nurse   Safe discharge environment identified: Yes  Barriers to discharge: Yes       Entered by: Susanne Iverson 07/07/2020 12:02 AM     Pt A&Ox4, hypertensive otherwise VSS. Denies any chest pain or SOB, however pt dyspneic with exertion, pt reports that this is his baseline. Independent in room. Trops neg x2. Plan for lexiscan in AM. Will continue to monitor.   Please review provider order for any additional goals.   Nurse to notify provider when observation goals have been met and patient is ready for discharge.

## 2020-07-08 ENCOUNTER — APPOINTMENT (OUTPATIENT)
Dept: NUCLEAR MEDICINE | Facility: CLINIC | Age: 85
End: 2020-07-08
Attending: PHYSICIAN ASSISTANT
Payer: COMMERCIAL

## 2020-07-08 VITALS
HEART RATE: 67 BPM | WEIGHT: 315 LBS | SYSTOLIC BLOOD PRESSURE: 127 MMHG | OXYGEN SATURATION: 93 % | DIASTOLIC BLOOD PRESSURE: 60 MMHG | RESPIRATION RATE: 18 BRPM | BODY MASS INDEX: 46.65 KG/M2 | TEMPERATURE: 97 F | HEIGHT: 69 IN

## 2020-07-08 LAB
CV STRESS MAX HR HE: 76
INTERPRETATION ECG - MUSE: NORMAL
NUC STRESS EJECTION FRACTION: 69 %
RATE PRESSURE PRODUCT: 9728
STRESS ECHO BASELINE DIASTOLIC HE: 81
STRESS ECHO BASELINE HR: 70
STRESS ECHO BASELINE SYSTOLIC BP: 141
STRESS ECHO CALCULATED PERCENT HR: 56 %
STRESS ECHO LAST STRESS DIASTOLIC BP: 59
STRESS ECHO LAST STRESS SYSTOLIC BP: 128
STRESS ECHO TARGET HR: 136

## 2020-07-08 PROCEDURE — 25000132 ZZH RX MED GY IP 250 OP 250 PS 637: Performed by: PHYSICIAN ASSISTANT

## 2020-07-08 PROCEDURE — A9502 TC99M TETROFOSMIN: HCPCS | Performed by: HOSPITALIST

## 2020-07-08 PROCEDURE — 99217 ZZC OBSERVATION CARE DISCHARGE: CPT | Performed by: PHYSICIAN ASSISTANT

## 2020-07-08 PROCEDURE — G0378 HOSPITAL OBSERVATION PER HR: HCPCS

## 2020-07-08 PROCEDURE — 99207 ZZC CDG-CHARGE REQUIRED MANUAL ENTRY: CPT | Performed by: PHYSICIAN ASSISTANT

## 2020-07-08 PROCEDURE — 25000132 ZZH RX MED GY IP 250 OP 250 PS 637: Performed by: HOSPITALIST

## 2020-07-08 PROCEDURE — 34300033 ZZH RX 343: Performed by: HOSPITALIST

## 2020-07-08 RX ORDER — FUROSEMIDE 20 MG
20 TABLET ORAL DAILY PRN
Qty: 15 TABLET | Refills: 0 | Status: SHIPPED | OUTPATIENT
Start: 2020-07-08 | End: 2024-08-04

## 2020-07-08 RX ORDER — PANTOPRAZOLE SODIUM 40 MG/1
40 TABLET, DELAYED RELEASE ORAL
Status: DISCONTINUED | OUTPATIENT
Start: 2020-07-08 | End: 2020-07-08 | Stop reason: HOSPADM

## 2020-07-08 RX ADMIN — Medication 33 MILLICURIE: at 08:42

## 2020-07-08 RX ADMIN — PANTOPRAZOLE SODIUM 40 MG: 40 TABLET, DELAYED RELEASE ORAL at 09:10

## 2020-07-08 RX ADMIN — SERTRALINE HYDROCHLORIDE 100 MG: 100 TABLET ORAL at 09:10

## 2020-07-08 ASSESSMENT — MIFFLIN-ST. JEOR: SCORE: 2260.26

## 2020-07-08 NOTE — PLAN OF CARE
PRIMARY DIAGNOSIS: CHEST PAIN  OUTPATIENT/OBSERVATION GOALS TO BE MET BEFORE DISCHARGE:  1. Ruled out acute coronary syndrome (negative or stable Troponin):  Yes  2. Pain Status: Pain free.  3. Appropriate provocative testing performed: Yes  - Stress Test Procedure: Lesiscan  - Interpretation of cardiac rhythm per telemetry tech: SR 1st AVB, BBB with frequent pauses - longest 2.4     4. Cleared by Consultants (if applicable):Na  5. Return to near baseline physical activity: Yes  Discharge Planner Nurse   Safe discharge environment identified: Yes  Barriers to discharge: No       Entered by: Magi Wayne 07/08/2020 12:29 PM     Please review provider order for any additional goals.   Nurse to notify provider when observation goals have been met and patient is ready for discharge.    Pt is a/o. Up Independent with cane. VSS. Chest pain is a 1, states he can feel it. Had lexiscan today. Trops negative. Tele tech called and pt having pauses of up to 2.4 sec. Appetite good. Plan TBD.

## 2020-07-08 NOTE — PLAN OF CARE
PRIMARY DIAGNOSIS: CHEST PAIN  OUTPATIENT/OBSERVATION GOALS TO BE MET BEFORE DISCHARGE:  1. Ruled out acute coronary syndrome (negative or stable Troponin):  Yes  2. Pain Status: Improved-controlled with oral pain medications.  3. Appropriate provocative testing performed: Yes  - Stress Test Procedure: first day completed 2nd half will be done today  - Interpretation of cardiac rhythm per telemetry tech: Afib/Aflutter CVR per tele    4. Cleared by Consultants (if applicable):No  5. Return to near baseline physical activity: Yes  Discharge Planner Nurse   Safe discharge environment identified: Yes  Barriers to discharge: Yes        Entered by: Brynn Tejeda 07/08/2020 3:45 AM      Pt is A&O x4, said he has chest pain but declined any interventions.  FLORENTINO BLEVINS, 95% on RA, had a 2.3 second pause md aware, continue to monitor.      Please review provider order for any additional goals.   Nurse to notify provider when observation goals have been met and patient is ready for discharge.

## 2020-07-08 NOTE — PLAN OF CARE
PRIMARY DIAGNOSIS: CHEST PAIN  OUTPATIENT/OBSERVATION GOALS TO BE MET BEFORE DISCHARGE:  1. Ruled out acute coronary syndrome (negative or stable Troponin):  Yes  2. Pain Status: Improved-controlled with oral pain medications.  3. Appropriate provocative testing performed: Yes  - Stress Test Procedure: first day completed 2nd half will be done today  - Interpretation of cardiac rhythm per telemetry tech: Afib/Aflutter CVR per tele     4. Cleared by Consultants (if applicable):No  5. Return to near baseline physical activity: Yes  Discharge Planner Nurse   Safe discharge environment identified: Yes  Barriers to discharge: Yes        Entered by: Brynn Tejeda 07/08/2020 3:45 AM      Pt is A&O x4, said he has chest pain but declined any interventions.  SL GEN, 95% on RA, had a 2.3 second pause md aware, BP was 90/38 md wanted a recheck in 1hr, was 100/56. continue to monitor.       Please review provider order for any additional goals.   Nurse to notify provider when observation goals have been met and patient is ready for discharge.

## 2020-07-08 NOTE — PROGRESS NOTES
X-cover    Called for 2.3 second pause while sleeping. Cont to monitor    X-cover #2  Called for asx BP 90/38, patient is apparently sleeping in his chair.  Requested recheck in one hour or with sx

## 2020-07-08 NOTE — PLAN OF CARE
Patient's After Visit Summary was reviewed with patient   Patient verbalized understanding of After Visit Summary, recommended follow up and was given an opportunity to ask questions.   Discharge medications sent home with patient/family:  No., instructions given to pick of prescriptions at [facility] per written instruction given.      Discharged with NST to front door via wheelchair to meet ride from significant other.      OBSERVATION patient END time: 15:36

## 2020-07-08 NOTE — PLAN OF CARE
PRIMARY DIAGNOSIS: CHEST PAIN  OUTPATIENT/OBSERVATION GOALS TO BE MET BEFORE DISCHARGE:  1. Ruled out acute coronary syndrome (negative or stable Troponin):  Yes  2. Pain Status: Pain free.  3. Appropriate provocative testing performed: Yes  - Stress Test Procedure: Lesiscan  - Interpretation of cardiac rhythm per telemetry tech: SR 1st AVB, BBB with frequent pauses - longest 2.4    4. Cleared by Consultants (if applicable):Na  5. Return to near baseline physical activity: Yes  Discharge Planner Nurse   Safe discharge environment identified: Yes  Barriers to discharge: No       Entered by: Magi Wayne 07/08/2020 12:29 PM     Please review provider order for any additional goals.   Nurse to notify provider when observation goals have been met and patient is ready for discharge.  Pt is a/o. Up Independent with cane. VSS. Chest pain is a 1, states he can feel it. Had lexiscan today. Trops negative. Appetite good. Voiding.  Plan TBD.

## 2020-07-08 NOTE — PLAN OF CARE
"PRIMARY DIAGNOSIS: CHEST PAIN  OUTPATIENT/OBSERVATION GOALS TO BE MET BEFORE DISCHARGE:  1. Ruled out acute coronary syndrome (negative or stable Troponin):  Yes  2. Pain Status: Improved-controlled with oral pain medications.  3. Appropriate provocative testing performed: Yes  - Stress Test Procedure: Lexiscan, first day of two day test completed.    - Interpretation of cardiac rhythm per telemetry tech: SR, 1 and 2nd degree AV block Type I, HR 70s    4. Cleared by Consultants (if applicable):No  5. Return to near baseline physical activity: Yes  Blood pressure 112/52, pulse 67, temperature 97  F (36.1  C), temperature source Oral, resp. rate 16, height 1.753 m (5' 9\"), weight (!) 163 kg (359 lb 5.6 oz), SpO2 95 %.    Vital signs remain stable.  Patient's chest discomfort remains; rating his discomfort a 1 on a 1/10 scale.  Rhythm per tele tech:  SR., 1 nd 2nd degree AV Block, type 1, HR 60s.  + 2 edema of bilateral lower extremities noted from just below knee to toes.  Patient is up independently in room ambulating to the bathroom prn with aid of a cane.  Plan:  2nd half of Lexiscan planned tomorrow.  NP0 after 2400.     Discharge Planner Nurse   Safe discharge environment identified: Yes  Barriers to discharge: Yes, unless medically cleared.         Entered by: Karen M. Lesch 07/07/2020 20:42 PM      Please review provider order for any additional goals.   Nurse to notify provider when observation goals have been met and patient is ready for discharge.  "

## 2020-07-08 NOTE — DISCHARGE SUMMARY
"[facility]    Observation Unit  Discharge Summary  Hospitalist    Date of Admission:  7/6/2020  Date of Discharge:  7/8/2020  Provider:  Marichuy Burgess PA-C  Date of Service (when I last saw the patient): 07/08/20    Discharge Diagnoses   Exertional chest pain   Asymptomatic pauses     Other medical issues:  Past Medical History:   Diagnosis Date     Depressive disorder, not elsewhere classified      History of tension headache      Hypertension      Obesity, unspecified      Obstructive sleep apnea (adult) (pediatric)      Osteoarthritis      Unspecified cerebral artery occlusion with cerebral infarction     no residual     History of Present Illness   Seth Mcdaniels is a 84 year old male with a PMH significant for KALEIGH, HTN, pAfib, CVA on Xarelto and no significant hx of CAD (last Lexiscan, showed fixed inferolateral defect w/o ischemia though it was a challenging study) who was admitted on 7/6/20 for a 10 day history of chest discomfort that worsens with activity. Please see the admission history and physical for full details.    Hospital Course   Seth Mcdaniels was admitted on 7/6/2020.  The following problems were addressed during his hospitalization:    #Exertional chest pain: describes as a heaviness or a \"punch\" into the substernal chest region and SOB with increased activity and then improves with rest. Serial troponins negative, initial EKG without ischemic changes. Chest x-ray and BNP WNL. Chest pain free on admission. Onset of central nonradiating chest pain the morning of 7/7 without associated shortness of breath but noted burning sensation. Repeat troponin negative, VSS, and EKG without new ischemic changes. Minimal improvement with two sublingual nitroglycerin and GI cocktail. Completed Lexiscan over two days based on weight that showed previous area of nontransmural infarction in the inferolateral segments of the LV associated with a mild degree of issac-infarct ischemia. No formal " "diagnosis of CAD previously. Patient was previously on Losartan but stopped after a hospitalization where he was hypotensive. Patient has had pauses up to 2.3 seconds while admitted thus a beta blocker would not be to his benefit.   - Start ASA 81 mg daily   - Not prescribed ACEI, ARB, or beta blocker based on history   - Trial omeprazole 20 mg for 6-8 weeks to see if this improves his symptoms  - Recommend starting an exercise regimen to improve endurance   - Follow up with PCP to discuss outpatient echocardiogram and/or PFTs    #Asymptomatic pauses: noted to have pauses up to 2.3 seconds on telemetry during stay without associated symptoms.     #PVD: evidence of chronic venous stasis of bilateral LE with 1+ pitting edema or feet and ankles. Weight improved from 359 to 348 pounds with diuresis.   - Received IV Lasix 20 mg x2 during stay with improvement in weight  - Wear compression stockings daily, take off at night  - Elevated legs when sitting down   - Recommend taking PO Lasix 20 mg if >2 pound weight gain in one day     Pending Results   None    Code Status   Full Code       Primary Care Physician   [facility]    Exam:    /57 (BP Location: Right arm)   Pulse 67   Temp 95.9  F (35.5  C) (Oral)   Resp 18   Ht 1.753 m (5' 9\")   Wt (!) 158 kg (348 lb 4.8 oz)   SpO2 95%   BMI 51.43 kg/m    GEN:  Alert, oriented x 3, appears comfortable, NAD.  HEENT:  Normocephalic/atraumatic, no scleral icterus, no nasal discharge, mouth moist.  CV:  Distant heart sounds due to body habitus, regular rate and rhythm, no murmur or JVD.  S1 + S2 noted, no S3 or S4.  LUNGS:  Clear to auscultation bilaterally without rales/rhonchi/wheezing/retractions. Symmetric chest rise on inhalation noted.  ABD:  Active bowel sounds, soft, non-tender/non-distended.  No rebound/guarding/rigidity.  EXT:  No edema.  No cyanosis.  No acute joint synovitis noted.  SKIN:  Dry to touch, no exanthems noted in the visualized " areas.    Discharge Disposition   Discharged to home    Consultations This Hospital Stay   None    Time Spent on this Encounter   I, Tavia Burgess PA-C, personally saw the patient today and spent greater than 30 minutes discharging this patient.    Discharge Orders      Reason for your hospital stay    You were admitted to the observation unit for chest pain and shortness of breath. Your heart was monitored overnight with no abnormal findings. Your cardiac enzymes were negative for heart attack. You had a two day chemical stress test that was negative for new heart disease and with no changes from your previous stress test in 2015 so we do not believe your chest pain was related to your heart. Other possibilities include reflux, physical deconditioning, pulmonary hypertension, or underlying lung disorder. We recommend you follow up with your primary doctor if you continue to have exertional shortness of breath, if you do you would likely benefit from having an outpatient ultrasound of your heart (echocardiogram) and/or pulmonary function tests to assess your lung function. You were started on a proton pump inhibitor to control the acid in your stomach in case acid reflux is contributing to your symptoms, you should continue to take Omeprazole for the next 6 weeks to see if this keeps your symptoms controlled. You were treated with IV Lasix during your stay due to concern for recent weight gain and increased swelling of you legs, you should continue oral Lasix as an outpatient as needed if you gain >2 pounds over a 24 hours period.     Follow-up and recommended labs and tests     Follow up with primary care provider, [facility], within 7 days for hospital follow-up and symptoms management.  No follow up labs or test are needed.     Activity    Your activity upon discharge: activity as tolerated and would slowly start exercise regimen involving walking     Discharge Instructions    1. Discussed  that due to your previously seen heart disease on your stress test that you should be medically optimized from a heart standpoint but you have previously been taken off Losartan for your blood pressure due to low blood pressures in 2015 and due to having short pauses on the heart monitor here would likely not tolerate a beta blocker thus the only medication addition for heart disease would be an aspirin 81 mg, if you have any signs of increased bleeding with this it may need to be discontinued.  2. Wear compressions stockings when you are awake during the day and take off at night     Monitor and record    weight every day, if you gain >2 pounds you should take one tablet of Lasix 20 mg     Full Code     Diet    Follow this diet upon discharge: low cholesterol, sodium diet, limit caffeine to 1 cup of coffee per day, avoid citrus and spicy foods     Discharge Medications   Current Discharge Medication List      START taking these medications    Details   aspirin (ASA) 81 MG EC tablet Take 1 tablet (81 mg) by mouth daily  Qty:  , Refills: 0    Associated Diagnoses: Coronary artery disease involving native heart without angina pectoris, unspecified vessel or lesion type      furosemide (LASIX) 20 MG tablet Take 1 tablet (20 mg) by mouth daily as needed (for >2 pound weight gain over one day)  Qty: 15 tablet, Refills: 0    Associated Diagnoses: Bilateral leg edema      omeprazole (PRILOSEC) 20 MG DR capsule Take 1 capsule (20 mg) by mouth daily  Qty: 42 capsule, Refills: 0    Associated Diagnoses: Gastroesophageal reflux disease without esophagitis         CONTINUE these medications which have NOT CHANGED    Details   acetaminophen (TYLENOL) 325 MG tablet Take 975 mg by mouth every 8 hours as needed for mild pain      atorvastatin (LIPITOR) 80 MG tablet Take 80 mg by mouth At Bedtime      rivaroxaban ANTICOAGULANT (XARELTO) 20 MG TABS tablet Take 1 tablet (20 mg) by mouth daily (with dinner)  Qty: 30 tablet, Refills: 0     Associated Diagnoses: Atrial fibrillation (H); Stroke (H)      SERTRALINE HCL PO Take 100 mg by mouth every morning            Allergies   Allergies   Allergen Reactions     Lisinopril Cough     Data   Results for orders placed or performed during the hospital encounter of 07/06/20   Chest XR,  PA & LAT     Status: None    Narrative    XR CHEST 2 VW  7/6/2020 4:14 PM       INDICATION: chest pain  COMPARISON: 7/19/2015       Impression    IMPRESSION: Negative chest.    MEGAN FRANK MD   CBC with platelets differential     Status: Abnormal   Result Value Ref Range    WBC 9.0 4.0 - 11.0 10e9/L    RBC Count 4.46 4.4 - 5.9 10e12/L    Hemoglobin 13.2 (L) 13.3 - 17.7 g/dL    Hematocrit 42.0 40.0 - 53.0 %    MCV 94 78 - 100 fl    MCH 29.6 26.5 - 33.0 pg    MCHC 31.4 (L) 31.5 - 36.5 g/dL    RDW 13.0 10.0 - 15.0 %    Platelet Count 167 150 - 450 10e9/L    Diff Method Automated Method     % Neutrophils 71.9 %    % Lymphocytes 13.8 %    % Monocytes 12.2 %    % Eosinophils 1.7 %    % Basophils 0.2 %    % Immature Granulocytes 0.2 %    Nucleated RBCs 0 0 /100    Absolute Neutrophil 6.5 1.6 - 8.3 10e9/L    Absolute Lymphocytes 1.2 0.8 - 5.3 10e9/L    Absolute Monocytes 1.1 0.0 - 1.3 10e9/L    Absolute Eosinophils 0.2 0.0 - 0.7 10e9/L    Absolute Basophils 0.0 0.0 - 0.2 10e9/L    Abs Immature Granulocytes 0.0 0 - 0.4 10e9/L    Absolute Nucleated RBC 0.0    Troponin I     Status: None   Result Value Ref Range    Troponin I ES <0.015 0.000 - 0.045 ug/L   Basic metabolic panel     Status: Abnormal   Result Value Ref Range    Sodium 141 133 - 144 mmol/L    Potassium 4.0 3.4 - 5.3 mmol/L    Chloride 109 94 - 109 mmol/L    Carbon Dioxide 27 20 - 32 mmol/L    Anion Gap 5 3 - 14 mmol/L    Glucose 90 70 - 99 mg/dL    Urea Nitrogen 15 7 - 30 mg/dL    Creatinine 0.88 0.66 - 1.25 mg/dL    GFR Estimate 78 >60 mL/min/[1.73_m2]    GFR Estimate If Black >90 >60 mL/min/[1.73_m2]    Calcium 8.2 (L) 8.5 - 10.1 mg/dL   BNP     Status: None    Result Value Ref Range    N-Terminal Pro BNP Inpatient 222 0 - 1,800 pg/mL   D dimer quantitative     Status: None   Result Value Ref Range    D Dimer 0.3 0.0 - 0.50 ug/ml FEU   Asymptomatic COVID-19 Virus (Coronavirus) by PCR     Status: None    Specimen: Nasopharyngeal   Result Value Ref Range    COVID-19 Virus PCR to [facility] - Source Nasopharyngeal     COVID-19 Virus PCR to [facility] - Result Not Detected    Troponin I     Status: None   Result Value Ref Range    Troponin I ES <0.015 0.000 - 0.045 ug/L   Hemoglobin A1c     Status: None   Result Value Ref Range    Hemoglobin A1C 5.4 0 - 5.6 %   Lipid Profile     Status: None   Result Value Ref Range    Cholesterol 131 <200 mg/dL    Triglycerides 87 <150 mg/dL    HDL Cholesterol 41 >39 mg/dL    LDL Cholesterol Calculated 73 <100 mg/dL    Non HDL Cholesterol 90 <130 mg/dL   Troponin I     Status: None   Result Value Ref Range    Troponin I ES <0.015 0.000 - 0.045 ug/L   EKG 12 lead     Status: None   Result Value Ref Range    Interpretation ECG Click View Image link to view waveform and result    EKG 12-lead, tracing only     Status: None   Result Value Ref Range    Interpretation ECG Click View Image link to view waveform and result    NM Lexiscan stress test (nuc card)     Status: None   Result Value Ref Range    Target      Baseline Systolic      Baseline Diastolic BP 81     Last Stress Systolic      Last Stress Diastolic BP 59     Baseline HR 70     Max HR 76     Calculated Percent HR 56 %    Rate Pressure Product 9,728.0     Left Ventricular EF 69 %    Narrative       The nuclear stress test is abnormal.     There is nontransmural infarction in the inferolateral segment(s) of   the left ventricle associated with a mild degree of issac-infarct ischemia.     Left ventricular function is normal.     The left ventricular ejection fraction at stress is 69%.     Mild left ventricular enlargement is noted.     A prior study was conducted on 7/16/2015.   This study has no change   when compared with the prior study.         Tavia Burgess PA-C

## 2021-03-01 ENCOUNTER — HOSPITAL ENCOUNTER (EMERGENCY)
Facility: CLINIC | Age: 86
Discharge: HOME | End: 2021-03-01
Attending: EMERGENCY MEDICINE | Admitting: EMERGENCY MEDICINE
Payer: COMMERCIAL

## 2021-03-01 ENCOUNTER — APPOINTMENT (OUTPATIENT)
Dept: GENERAL RADIOLOGY | Facility: CLINIC | Age: 86
End: 2021-03-01
Attending: EMERGENCY MEDICINE
Payer: COMMERCIAL

## 2021-03-01 ENCOUNTER — TRANSFERRED RECORDS (OUTPATIENT)
Dept: HEALTH INFORMATION MANAGEMENT | Facility: CLINIC | Age: 86
End: 2021-03-01

## 2021-03-01 VITALS
BODY MASS INDEX: 51.39 KG/M2 | SYSTOLIC BLOOD PRESSURE: 128 MMHG | HEART RATE: 50 BPM | OXYGEN SATURATION: 95 % | RESPIRATION RATE: 18 BRPM | WEIGHT: 315 LBS | DIASTOLIC BLOOD PRESSURE: 63 MMHG

## 2021-03-01 DIAGNOSIS — R06.09 DYSPNEA ON EXERTION: ICD-10-CM

## 2021-03-01 DIAGNOSIS — J18.9 PNEUMONIA OF RIGHT MIDDLE LOBE DUE TO INFECTIOUS ORGANISM: ICD-10-CM

## 2021-03-01 LAB
ANION GAP SERPL CALCULATED.3IONS-SCNC: 5 MMOL/L (ref 3–14)
BASOPHILS # BLD AUTO: 0 10E9/L (ref 0–0.2)
BASOPHILS NFR BLD AUTO: 0.3 %
BUN SERPL-MCNC: 15 MG/DL (ref 7–30)
CALCIUM SERPL-MCNC: 8.6 MG/DL (ref 8.5–10.1)
CHLORIDE SERPL-SCNC: 108 MMOL/L (ref 94–109)
CO2 SERPL-SCNC: 27 MMOL/L (ref 20–32)
CREAT SERPL-MCNC: 0.76 MG/DL (ref 0.66–1.25)
DIFFERENTIAL METHOD BLD: ABNORMAL
EOSINOPHIL # BLD AUTO: 0.1 10E9/L (ref 0–0.7)
EOSINOPHIL NFR BLD AUTO: 2 %
ERYTHROCYTE [DISTWIDTH] IN BLOOD BY AUTOMATED COUNT: 13.3 % (ref 10–15)
FLUAV RNA RESP QL NAA+PROBE: NEGATIVE
FLUBV RNA RESP QL NAA+PROBE: NEGATIVE
GFR SERPL CREATININE-BSD FRML MDRD: 83 ML/MIN/{1.73_M2}
GLUCOSE BLDC GLUCOMTR-MCNC: 82 MG/DL (ref 70–99)
GLUCOSE SERPL-MCNC: 86 MG/DL (ref 70–99)
HCT VFR BLD AUTO: 41.2 % (ref 40–53)
HGB BLD-MCNC: 13 G/DL (ref 13.3–17.7)
IMM GRANULOCYTES # BLD: 0 10E9/L (ref 0–0.4)
IMM GRANULOCYTES NFR BLD: 0.3 %
LABORATORY COMMENT REPORT: NORMAL
LACTATE BLD-SCNC: 1 MMOL/L (ref 0.7–2)
LYMPHOCYTES # BLD AUTO: 1.1 10E9/L (ref 0.8–5.3)
LYMPHOCYTES NFR BLD AUTO: 16.4 %
MCH RBC QN AUTO: 29.9 PG (ref 26.5–33)
MCHC RBC AUTO-ENTMCNC: 31.6 G/DL (ref 31.5–36.5)
MCV RBC AUTO: 95 FL (ref 78–100)
MONOCYTES # BLD AUTO: 0.8 10E9/L (ref 0–1.3)
MONOCYTES NFR BLD AUTO: 12.1 %
NEUTROPHILS # BLD AUTO: 4.7 10E9/L (ref 1.6–8.3)
NEUTROPHILS NFR BLD AUTO: 68.9 %
NRBC # BLD AUTO: 0 10*3/UL
NRBC BLD AUTO-RTO: 0 /100
NT-PROBNP SERPL-MCNC: 335 PG/ML (ref 0–1800)
PLATELET # BLD AUTO: 163 10E9/L (ref 150–450)
POTASSIUM SERPL-SCNC: 4.4 MMOL/L (ref 3.4–5.3)
RBC # BLD AUTO: 4.35 10E12/L (ref 4.4–5.9)
RSV RNA SPEC QL NAA+PROBE: NORMAL
SARS-COV-2 RNA RESP QL NAA+PROBE: NEGATIVE
SODIUM SERPL-SCNC: 140 MMOL/L (ref 133–144)
SPECIMEN SOURCE: NORMAL
TROPONIN I SERPL-MCNC: <0.015 UG/L (ref 0–0.04)
WBC # BLD AUTO: 6.9 10E9/L (ref 4–11)

## 2021-03-01 PROCEDURE — 250N000013 HC RX MED GY IP 250 OP 250 PS 637: Performed by: EMERGENCY MEDICINE

## 2021-03-01 PROCEDURE — 83880 ASSAY OF NATRIURETIC PEPTIDE: CPT | Performed by: EMERGENCY MEDICINE

## 2021-03-01 PROCEDURE — 85025 COMPLETE CBC W/AUTO DIFF WBC: CPT | Performed by: EMERGENCY MEDICINE

## 2021-03-01 PROCEDURE — 36415 COLL VENOUS BLD VENIPUNCTURE: CPT | Performed by: EMERGENCY MEDICINE

## 2021-03-01 PROCEDURE — 83605 ASSAY OF LACTIC ACID: CPT | Performed by: EMERGENCY MEDICINE

## 2021-03-01 PROCEDURE — 71045 X-RAY EXAM CHEST 1 VIEW: CPT

## 2021-03-01 PROCEDURE — 84484 ASSAY OF TROPONIN QUANT: CPT | Performed by: EMERGENCY MEDICINE

## 2021-03-01 PROCEDURE — 87040 BLOOD CULTURE FOR BACTERIA: CPT | Performed by: EMERGENCY MEDICINE

## 2021-03-01 PROCEDURE — 80048 BASIC METABOLIC PNL TOTAL CA: CPT | Performed by: EMERGENCY MEDICINE

## 2021-03-01 PROCEDURE — 93005 ELECTROCARDIOGRAM TRACING: CPT

## 2021-03-01 PROCEDURE — 87636 SARSCOV2 & INF A&B AMP PRB: CPT | Performed by: EMERGENCY MEDICINE

## 2021-03-01 PROCEDURE — 999N001017 HC STATISTIC GLUCOSE BY METER IP

## 2021-03-01 PROCEDURE — 99285 EMERGENCY DEPT VISIT HI MDM: CPT | Mod: 25

## 2021-03-01 PROCEDURE — C9803 HOPD COVID-19 SPEC COLLECT: HCPCS

## 2021-03-01 RX ORDER — LEVOFLOXACIN 750 MG/1
750 TABLET, FILM COATED ORAL DAILY
Qty: 4 TABLET | Refills: 0 | Status: SHIPPED | OUTPATIENT
Start: 2021-03-02 | End: 2024-07-29

## 2021-03-01 RX ORDER — FERROUS SULFATE 325(65) MG
325 TABLET ORAL
COMMUNITY

## 2021-03-01 RX ORDER — VITAMIN E 268 MG
1000 CAPSULE ORAL DAILY
COMMUNITY

## 2021-03-01 RX ORDER — ATORVASTATIN CALCIUM 40 MG/1
40 TABLET, FILM COATED ORAL EVERY EVENING
COMMUNITY

## 2021-03-01 RX ORDER — LOSARTAN POTASSIUM 25 MG/1
25 TABLET ORAL DAILY
COMMUNITY
End: 2024-07-29

## 2021-03-01 RX ORDER — OMEPRAZOLE 40 MG/1
40 CAPSULE, DELAYED RELEASE ORAL DAILY
COMMUNITY
End: 2021-03-01

## 2021-03-01 RX ADMIN — LEVOFLOXACIN 750 MG: 500 TABLET, FILM COATED ORAL at 13:27

## 2021-03-01 ASSESSMENT — ENCOUNTER SYMPTOMS
LIGHT-HEADEDNESS: 0
SHORTNESS OF BREATH: 1
COUGH: 0
DIZZINESS: 0
FEVER: 0

## 2021-03-01 NOTE — ED PROVIDER NOTES
"History     Chief Complaint:  Shortness of Breath and Bradycardia       The history is provided by the patient and the EMS personnel.     Seth Mcdaniels is a 85 year old male currently taking Xarelto for atrial fibrillation with a history of hypertension, obesity, and CVA who presents for evaluation of shortness of breath. The patient reports that he has been experiencing increased shortness of breath with exertion with associated chest discomfort. His chest discomfort waxes and wanes in severity and is constant. He also notes increased edema in his arms. For EMS, he was found to have a variable heart rate between 30 and 80 bpm. His blood glucose was 85. The patient took 324 mg of aspirin this morning.     Here, he reports that he \"feels like a million bucks\" and is not currently having any symptoms. He denies dizziness, lightheadedness, cough, fever, or other symptoms. He has not yet received a COVID vaccine.        Review of Systems   Constitutional: Negative for fever.   Respiratory: Positive for shortness of breath. Negative for cough.    Cardiovascular: Positive for chest pain (discomfort).   Musculoskeletal:        Positive for upper extremity edema   Neurological: Negative for dizziness and light-headedness.   All other systems reviewed and are negative.        Allergies:  Lisinopril      Medications:   Aspirin 81 mg  Atorvastatin   Lasix  Xarelto  Sertraline     Medical History:   Depression   Tension headache  Hypertension   Obesity   Osteoarthritis  Obstructive sleep apnea  Cerebral artery occlusion with cerebral infarction   Presbyacusis  Memory loss  CVA  Atrial fibrillation   Right hemiparesis     Surgical History:  Arthroplasty knee - right  Colonoscopy   Phacoemulsification clear cornea with standard IOL - bilateral     Social History:  The patient was accompanied to the ED by EMS.  [facility]        Physical Exam     Patient Vitals for the past 24 hrs:   BP Pulse Resp SpO2 "   03/01/21 1200 -- -- -- 94 %   03/01/21 1159 -- -- -- 93 %   03/01/21 1155 128/79 71 22 96 %          Physical Exam  Constitutional: Alert, attentive, elevated BMI, speaking full sentences  HENT:    Nose: Nose normal.    Mouth/Throat: Oropharynx is clear, mucous membranes are moist   Eyes: EOM are normal.   CV: regular rate and rhythm; no murmurs, rubs or gallups  Chest: Effort normal and breath sounds normal.   GI:  There is no tenderness. No distension. Normal bowel sounds  MSK: Normal range of motion. No LE edema   Neurological: Alert, attentive  Skin: Skin is warm and dry.      Emergency Department Course     ECG:  ECG taken at 159, ECG read at 1205  Sinus rhythm with 1st degree AV block  Left axis deviation   Low voltage QRS  Incomplete right bundle branch block  Inferior infarct, age undetermined   Abnormal ECG   No significant changes as compared to prior, dated 07/07/2020.  Rate 62 bpm. ME interval 294 ms. QRS duration 104 ms. QT/QTc 430/436 ms. P-R-T axes 63 -40 2.     Imaging:    XR Chest Port 1 View:  IMPRESSION: Small hazy opacity in the right mid lung could represent   developing pneumonia. No other focal infiltrate. No significant   pleural effusion or pneumothorax. The cardiac and mediastinal silhouettes are normal.   Reading per radiology.     Laboratory:    CBC: WBC 6.9, HGB 13.0 (L),   BMP: AWNL (Creatinine 0.76)    BNP: 335   Glucose by Meter (Collected 1202): 82   Troponin (Collected 1159): <0.015    Lactic acid (result time 1212) 1.0    Blood cultures pending x2    Symptomatic Influenza A/B & SARS-CoV2 (COVID19) Virus PCR Multiplex: Negative       Emergency Department Course:    Reviewed:  I reviewed the patient's nursing notes, vitals, past medical records, Care Everywhere.     Assessments:  1150 In room, preparing for patient arrival.  1151 EMS arrival, history obtained from EMS.   1154 I performed an exam of the patient, as documented above.   1326 Patient rechecked and updated  following imaging and laboratory results.     Interventions:  1327 Levaquin 750 mg PO    Disposition:  The patient was discharged to home.     Impression & Plan     Medical Decision Making:  This is an 85-year-old male with complex history including A. fib on Xarelto, atypical chronic chest pain with negative nuclear study in July 2020, and stroke, who presents for evaluation of dyspnea on exertion and atypical chest pain.  Regarding his dyspnea, he experiences only with longer walks out to the mailbox whereas he does not in the house.  Chest pain has been essentially constant for 2 weeks and appears consistent with previous episodes documented in the EMR.  Given longstanding and atypical pain, his negative EKG and troponin essentially rule out AMI.  He is very low risk of PE based on his Xarelto use.  X-ray shows no widened mediastinum or pneumothorax but possible consolidation is identified.  In this context, recent onset shortness of breath may be related to pneumonia.  Exam does not reveal convincing evidence of CHF and screening BNP is negative as well.  Plan trial of Levaquin and follow-up in 2 to 3 days in clinic.  An additional factor to his dyspnea could be obesity hypoventilation syndrome given his elevated BMI. Return precautions for difficulty breathing, chest pain, or any other concerns.    Covid-19  Seth Mcdaniels was evaluated during a global COVID-19 pandemic, which necessitated consideration that the patient might be at risk for infection with the SARS-CoV-2 virus that causes COVID-19.   Applicable protocols for evaluation were followed during the patient's care.   COVID-19 was considered as part of the patient's evaluation. The plan for testing is:  a test was obtained during this visit.    Diagnosis:     ICD-10-CM    1. Dyspnea on exertion  R06.00    2. Pneumonia of right middle lobe due to infectious organism  J18.9         Discharge Medications:  New Prescriptions    LEVOFLOXACIN (LEVAQUIN) 750  MG TABLET    Take 1 tablet (750 mg) by mouth daily       Scribe Disclosure:  I, Jyoti Diaz, am serving as a scribe at 12:08 PM on 3/1/2021 to document services personally performed by Sanya Dougherty MD based on my observations and the provider's statements to me.        Sanya Dougherty MD  03/01/21 [id number]

## 2021-03-01 NOTE — ED TRIAGE NOTES
"Arrived by EMS, reports two weeks of shortness of breath with activity, pt states when \"walking to mailbox.\"  Seen at [facility] [place] today.  Clinic called EMS for EKG changes.  EMS reports HR dropping from 60s to 30s in route.  ABCs intact.  A&Ox4  "

## 2021-03-01 NOTE — ED NOTES
Bed: ED03  Expected date: 3/1/21  Expected time: 11:37 AM  Means of arrival: Ambulance  Comments:  [id number]

## 2021-03-01 NOTE — PHARMACY-ADMISSION MEDICATION HISTORY
Admission medication history interview status for this patient is complete. See [facility] admission navigator for allergy information, prior to admission medications and immunization status.     Medication history interview source(s):Patient and wife  Medication history resources (including written lists, pill bottles, clinic record):EPIC list, wife list  Primary pharmacy:[facility] [place], [place]    Changes made to PTA medication list:  Added: flaxseed, preservision, iron, losartan, lipitor 40mg, Flairgin (non formulary)  Deleted: lipitor 80mg daily  Changed: -----    Actions taken by pharmacist (provider contacted, etc):None     Additional medication history information:pt states flairgin replaced omeprazole for reflus.  This writer is Uncertain what flairgin is    Medication reconciliation/reorder completed by provider prior to medication history? No          Prior to Admission medications    Medication Sig Last Dose Taking? Auth Provider   aspirin (ASA) 81 MG EC tablet Take 1 tablet (81 mg) by mouth daily 3/1/2021 at Unknown time Yes Tavia Burgess PA-C   atorvastatin (LIPITOR) 40 MG tablet Take 40 mg by mouth every evening 2/28/2021 at Unknown time Yes Unknown, Entered By History   ferrous sulfate (FEROSUL) 325 (65 Fe) MG tablet Take 325 mg by mouth daily (with breakfast) 3/1/2021 at am Yes Unknown, Entered By History   Flaxseed, Linseed, (FLAXSEED OIL) 1000 MG CAPS Take 1,000 mg by mouth daily 3/1/2021 at am Yes Unknown, Entered By History   levofloxacin (LEVAQUIN) 750 MG tablet Take 1 tablet (750 mg) by mouth daily  Yes Sanya Dougherty MD   losartan (COZAAR) 25 MG tablet Take 25 mg by mouth daily 3/1/2021 at am Yes Unknown, Entered By History   Multiple Vitamins-Minerals (PRESERVISION AREDS 2 PO) Take 1 tablet by mouth 2 times daily 3/1/2021 at am Yes Unknown, Entered By History   NONFORMULARY OTC Supplement-Flairgin 1 daily prn reflux 3/1/2021 at Unknown time Yes Unknown, Entered By History    rivaroxaban ANTICOAGULANT (XARELTO) 20 MG TABS tablet Take 1 tablet (20 mg) by mouth daily (with dinner) 2/28/2021 at dinner Yes Khoa Diaz MD   SERTRALINE HCL PO Take 100 mg by mouth every morning  3/1/2021 at am Yes Reported, Patient   acetaminophen (TYLENOL) 325 MG tablet Take 975 mg by mouth every 8 hours as needed for mild pain   Unknown, Entered By History   furosemide (LASIX) 20 MG tablet Take 1 tablet (20 mg) by mouth daily as needed (for >2 pound weight gain over one day)   Tavia Burgess PA-C

## 2021-03-01 NOTE — DISCHARGE INSTRUCTIONS
Discharge Instructions  Pneumonia    You were seen today for a chest infection or inflammation. If your provider decided this was due to a bacterial infection, you may need an antibiotic. Sometimes these are caused by a virus, and then an antibiotic will not help.     Generally, every Emergency Department visit should have a follow-up clinic visit with either a primary or a specialty clinic/provider. Please follow-up as instructed by your emergency provider today.    Return to the Emergency Department if:  Your breathing gets much worse.  You are very weak, or feel much more ill.  You develop new symptoms, such as chest pain.  You cough up blood.  You are vomiting (throwing up) enough that you cannot keep fluids or your medicine down.    What can I do to help myself?  Fill any prescriptions the provider gave you and take them right away--especially antibiotics. Be sure to finish the whole antibiotic prescription.  You may be given a prescription for an inhaler, which can help loosen tight air passages.  Use this as needed, but not more often than directed. Inhalers work much better when used with a spacer.   You may be given a prescription for a steroid to reduce inflammation. Used long-term, these can have side effects, but for short-term use they are safe. You may notice restlessness or increased appetite.      You may use non-prescription cough or cold medicines. Cough medicines may help, but don t make the cough go away completely.   Avoid smoke, because this can make your symptoms worse. If you smoke, this may be a good time to quit! Consider using nicotine lozenges, gum, or patches to reduce cravings.   If you have a fever, Tylenol  (acetaminophen), Motrin  (ibuprofen), or Advil  (ibuprofen) may help bring fever down and may help you feel more comfortable. Be sure to read and follow the package directions, and ask your provider if you have questions.  Be sure to get your flu shot each year.  For certain ages,  the pneumonia shot can help prevent pneumonia.  If you were given a prescription for medicine here today, be sure to read all of the information (including the package insert) that comes with your prescription.  This will include important information about the medicine, its side effects, and any warnings that you need to know about.  The pharmacist who fills the prescription can provide more information and answer questions you may have about the medicine.  If you have questions or concerns that the pharmacist cannot address, please call or return to the Emergency Department.     Remember that you can always come back to the Emergency Department if you are not able to see your regular provider in the amount of time listed above, if you get any new symptoms, or if there is anything that worries you.

## 2021-03-02 LAB — INTERPRETATION ECG - MUSE: NORMAL

## 2021-03-07 LAB
BACTERIA SPEC CULT: NO GROWTH
BACTERIA SPEC CULT: NO GROWTH
SPECIMEN SOURCE: NORMAL
SPECIMEN SOURCE: NORMAL

## 2022-05-22 ENCOUNTER — APPOINTMENT (OUTPATIENT)
Dept: GENERAL RADIOLOGY | Facility: CLINIC | Age: 87
End: 2022-05-22
Attending: EMERGENCY MEDICINE
Payer: COMMERCIAL

## 2022-05-22 ENCOUNTER — HOSPITAL ENCOUNTER (EMERGENCY)
Facility: CLINIC | Age: 87
Discharge: HOME | End: 2022-05-22
Attending: EMERGENCY MEDICINE | Admitting: EMERGENCY MEDICINE
Payer: COMMERCIAL

## 2022-05-22 VITALS
OXYGEN SATURATION: 96 % | SYSTOLIC BLOOD PRESSURE: 139 MMHG | RESPIRATION RATE: 18 BRPM | TEMPERATURE: 96.5 F | DIASTOLIC BLOOD PRESSURE: 84 MMHG | HEART RATE: 71 BPM

## 2022-05-22 DIAGNOSIS — I10 HYPERTENSION, UNSPECIFIED TYPE: ICD-10-CM

## 2022-05-22 DIAGNOSIS — S91.109A: ICD-10-CM

## 2022-05-22 DIAGNOSIS — S93.105A: ICD-10-CM

## 2022-05-22 DIAGNOSIS — Z79.01 CHRONIC ANTICOAGULATION: ICD-10-CM

## 2022-05-22 LAB
ANION GAP SERPL CALCULATED.3IONS-SCNC: 4 MMOL/L (ref 3–14)
BASOPHILS # BLD AUTO: 0 10E3/UL (ref 0–0.2)
BASOPHILS NFR BLD AUTO: 0 %
BUN SERPL-MCNC: 18 MG/DL (ref 7–30)
CALCIUM SERPL-MCNC: 8.5 MG/DL (ref 8.5–10.1)
CHLORIDE BLD-SCNC: 108 MMOL/L (ref 94–109)
CO2 SERPL-SCNC: 29 MMOL/L (ref 20–32)
CREAT SERPL-MCNC: 0.68 MG/DL (ref 0.66–1.25)
EOSINOPHIL # BLD AUTO: 0.2 10E3/UL (ref 0–0.7)
EOSINOPHIL NFR BLD AUTO: 2 %
ERYTHROCYTE [DISTWIDTH] IN BLOOD BY AUTOMATED COUNT: 13 % (ref 10–15)
GFR SERPL CREATININE-BSD FRML MDRD: >90 ML/MIN/1.73M2
GLUCOSE BLD-MCNC: 92 MG/DL (ref 70–99)
HCT VFR BLD AUTO: 43.1 % (ref 40–53)
HGB BLD-MCNC: 13.8 G/DL (ref 13.3–17.7)
IMM GRANULOCYTES # BLD: 0 10E3/UL
IMM GRANULOCYTES NFR BLD: 0 %
LYMPHOCYTES # BLD AUTO: 1.2 10E3/UL (ref 0.8–5.3)
LYMPHOCYTES NFR BLD AUTO: 15 %
MCH RBC QN AUTO: 30.8 PG (ref 26.5–33)
MCHC RBC AUTO-ENTMCNC: 32 G/DL (ref 31.5–36.5)
MCV RBC AUTO: 96 FL (ref 78–100)
MONOCYTES # BLD AUTO: 1 10E3/UL (ref 0–1.3)
MONOCYTES NFR BLD AUTO: 13 %
NEUTROPHILS # BLD AUTO: 5.4 10E3/UL (ref 1.6–8.3)
NEUTROPHILS NFR BLD AUTO: 70 %
NRBC # BLD AUTO: 0 10E3/UL
NRBC BLD AUTO-RTO: 0 /100
PLATELET # BLD AUTO: 158 10E3/UL (ref 150–450)
POTASSIUM BLD-SCNC: 4.1 MMOL/L (ref 3.4–5.3)
RBC # BLD AUTO: 4.48 10E6/UL (ref 4.4–5.9)
SODIUM SERPL-SCNC: 141 MMOL/L (ref 133–144)
WBC # BLD AUTO: 7.8 10E3/UL (ref 4–11)

## 2022-05-22 PROCEDURE — 99284 EMERGENCY DEPT VISIT MOD MDM: CPT | Mod: 25

## 2022-05-22 PROCEDURE — 80048 BASIC METABOLIC PNL TOTAL CA: CPT | Performed by: EMERGENCY MEDICINE

## 2022-05-22 PROCEDURE — 96365 THER/PROPH/DIAG IV INF INIT: CPT

## 2022-05-22 PROCEDURE — 73660 X-RAY EXAM OF TOE(S): CPT | Mod: LT

## 2022-05-22 PROCEDURE — 36415 COLL VENOUS BLD VENIPUNCTURE: CPT | Performed by: EMERGENCY MEDICINE

## 2022-05-22 PROCEDURE — 250N000011 HC RX IP 250 OP 636: Performed by: EMERGENCY MEDICINE

## 2022-05-22 PROCEDURE — 85014 HEMATOCRIT: CPT | Performed by: EMERGENCY MEDICINE

## 2022-05-22 PROCEDURE — 96375 TX/PRO/DX INJ NEW DRUG ADDON: CPT

## 2022-05-22 RX ORDER — BUPIVACAINE HYDROCHLORIDE 5 MG/ML
INJECTION, SOLUTION PERINEURAL
Status: DISCONTINUED
Start: 2022-05-22 | End: 2022-05-22 | Stop reason: HOSPADM

## 2022-05-22 RX ORDER — MORPHINE SULFATE 4 MG/ML
4 INJECTION, SOLUTION INTRAMUSCULAR; INTRAVENOUS ONCE
Status: COMPLETED | OUTPATIENT
Start: 2022-05-22 | End: 2022-05-22

## 2022-05-22 RX ORDER — LIDOCAINE 40 MG/G
CREAM TOPICAL
Status: DISCONTINUED | OUTPATIENT
Start: 2022-05-22 | End: 2022-05-22 | Stop reason: HOSPADM

## 2022-05-22 RX ORDER — CEFAZOLIN SODIUM 2 G/100ML
2 INJECTION, SOLUTION INTRAVENOUS ONCE
Status: COMPLETED | OUTPATIENT
Start: 2022-05-22 | End: 2022-05-22

## 2022-05-22 RX ADMIN — CEFAZOLIN SODIUM 2 G: 2 INJECTION, SOLUTION INTRAVENOUS at 17:22

## 2022-05-22 RX ADMIN — MORPHINE SULFATE 4 MG: 4 INJECTION INTRAVENOUS at 17:32

## 2022-05-22 NOTE — ED TRIAGE NOTES
"    Pt reports that he was in the kitchen cleaning his cabinets and was getting up and \"put enough pressure on the toe and broke it\" pt went to [facility] and they wrapped left big toe PTA, pt denies falls or LOC  "

## 2022-05-22 NOTE — ED NOTES
Bed: ED03  Expected date: 5/22/22  Expected time: 4:56 PM  Means of arrival:   Comments:  Triage

## 2022-05-22 NOTE — ED PROVIDER NOTES
History     Chief Complaint:    Toe Pain  And laceration  HPI   Seth Mcdaniels is a 86 year old male who presents with a history of chronic anticoagulation with Xarelto due to A. fib and stroke was sent from urgent care due to a laceration and deformity of his left great toe.  The patient reports that approxi-1 hour prior to arrival he was kneeling trying to fix a cabinet he tried to stand up heard a pop had pain in his left great toe and noted bleeding.  He went to the urgent care they dressed it and sent him to the ER.  He is noted deformity of that toe pain no distal numbness or tingling he says he did not fall he was probably trying to stand up.  The patient did note no significant bleeding and presented with a dressing in place.    Review of Systems  8 point review of systems all negative as in HPI    Allergies:    Lisinopril      Medications:      acetaminophen-codeine (TYLENOL #3) 300-30 MG tablet  amoxicillin-clavulanate (AUGMENTIN) 875-125 MG tablet  acetaminophen (TYLENOL) 325 MG tablet  aspirin (ASA) 81 MG EC tablet  atorvastatin (LIPITOR) 40 MG tablet  ferrous sulfate (FEROSUL) 325 (65 Fe) MG tablet  Flaxseed, Linseed, (FLAXSEED OIL) 1000 MG CAPS  furosemide (LASIX) 20 MG tablet  levofloxacin (LEVAQUIN) 750 MG tablet  losartan (COZAAR) 25 MG tablet  Multiple Vitamins-Minerals (PRESERVISION AREDS 2 PO)  NONFORMULARY  rivaroxaban ANTICOAGULANT (XARELTO) 20 MG TABS tablet  SERTRALINE HCL PO        Past Medical History:      Past Medical History:   Diagnosis Date     Depressive disorder, not elsewhere classified      History of tension headache      Hypertension      Obesity, unspecified      Obstructive sleep apnea (adult) (pediatric)      Osteoarthritis      Unspecified cerebral artery occlusion with cerebral infarction      Patient Active Problem List    Diagnosis Date Noted     ACP (advance care planning) 07/28/2015     Priority: Medium     Advance Care Planning 7/28/2015: Receipt of ACP document:   Received: Health Care Directive which was witnessed or notarized on 8-26-08.  Document previously scanned on 7-24-15.  Validation form completed and sent to be scanned.  Code Status reflects choices in most recent ACP document.  Confirmed/documented designated decision maker(s).  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 Provider to review       Presbyacusis 05/04/2006     Priority: Medium        Past Surgical History:      Past Surgical History:   Procedure Laterality Date     ARTHROPLASTY KNEE Right 7/17/2015    Procedure: ARTHROPLASTY KNEE;  Surgeon: Jarod Jaime MD;  Location:  OR     COLONOSCOPY      2003     PHACOEMULSIFICATION CLEAR CORNEA WITH STANDARD INTRAOCULAR LENS IMPLANT  11/29/2012    Procedure: PHACOEMULSIFICATION CLEAR CORNEA WITH STANDARD INTRAOCULAR LENS IMPLANT;  RIGHT EYE PHACOEMULSIFICATION CLEAR CORNEA WITH STANDARD INTRAOCULAR LENS IMPLANT ;  Surgeon: Cody Salazar MD;  Location: [facility]     PHACOEMULSIFICATION CLEAR CORNEA WITH TORIC INTRAOCULAR LENS IMPLANT  9/27/2012    Procedure: PHACOEMULSIFICATION CLEAR CORNEA WITH TORIC INTRAOCULAR LENS IMPLANT;  LEFT PHACOEMULSIFICAITON CLEAR CORNEA WITH  CHARLIE TORIC  INTRAOCULAR LENS IMPLANT ;  Surgeon: Cody Salazar MD;  Location: [facility]       Family History:      No family history on file.    Social History:    The patient presents to the ED with his wife    Physical Exam     No data  found.    Physical Exam  General: The patient is alert, in no respiratory distress.    HENT: Mucous membranes moist.    Cardiovascular: Good pulses.     Respiratory: Lungs are clear.     Gastrointestinal: Abdomen soft.     Musculoskeletal: The left great toe has visible bone in the area of the dista portion of the proximal phalanx coming out of a laceration.  There is lateral direction to the dislocated distal phalanx.    Skin: No rashes or petechiae.  There is a large 3 cm medial laceration over the IP joint of the left great toe.  There is some bleeding.    Neurologic: The patient is alert and oriented he reports he has sensation over the left great toe.  He is able unable to flex or extend it neurologically he is grossly intact otherwise.    Lymphatic: No cervical adenopathy.     Psychiatric: The patient is non-tearful.    Emergency Department Course     ECG results from 03/01/21   EKG 12 lead     Value    Interpretation ECG Click View Image link to view waveform and result       Imaging:  XR Toe Port Left G/E 2 Views   Final Result   IMPRESSION: There is a wound and soft tissue gas between the bases of the great toe and second toe. There is no evidence of radiopaque foreign body, fracture or osteomyelitis. Mild osteoarthrosis of the first MTP joint.        Report per radiology    Laboratory:  Labs Ordered and Resulted from Time of ED Arrival to Time of ED Departure   BASIC METABOLIC PANEL - Normal       Result Value    Sodium 141      Potassium 4.1      Chloride 108      Carbon Dioxide (CO2) 29      Anion Gap 4      Urea Nitrogen 18      Creatinine 0.68      Calcium 8.5      Glucose 92      GFR Estimate >90     CBC WITH PLATELETS AND DIFFERENTIAL    WBC Count 7.8      RBC Count 4.48      Hemoglobin 13.8      Hematocrit 43.1      MCV 96      MCH 30.8      MCHC 32.0      RDW 13.0      Platelet Count 158      % Neutrophils 70      % Lymphocytes 15      % Monocytes 13      % Eosinophils 2      % Basophils 0      %  Immature Granulocytes 0      NRBCs per 100 WBC 0      Absolute Neutrophils 5.4      Absolute Lymphocytes 1.2      Absolute Monocytes 1.0      Absolute Eosinophils 0.2      Absolute Basophils 0.0      Absolute Immature Granulocytes 0.0      Absolute NRBCs 0.0         Procedures:  Digital block location left great toe indication to allow cleaning and reduction of a dislocation with possible fracture.  Base the toe was cleaned with Betadine and a performed a digital block in the usual location with bupivacaine at a conscious and was obtained no complications    Dislocation of the great toe reduction.  Verbal consent was obtained I used traction and lateral pressure to reduce the toe and the bone was then covered and the color did slightly improve.  Anatomic alignment visually was obtained.  No complications.    Emergency Department Course:             Reviewed:    I reviewed nursing notes, vitals and past medical history    Assessments:   I obtained history and examined the patient as noted above.    I rechecked the patient and explained findings.       Consults:   Dr. Mcginnis-approves closure of the wound antibiotics and a walker boot         Interventions:    Medications   ceFAZolin (ANCEF) intermittent infusion 2 g in 100 mL dextrose PRE-MIX (0 g Intravenous Stopped 5/22/22 1848)   morphine (PF) injection 4 mg (4 mg Intravenous Given 5/22/22 1732)       Disposition:  The patient was discharged to home.     Impression & Plan      Medical Decision Making:  The patient had just been kneeling when he stood up and likely put pressure on the toe because of a traumatic dislocation.  The bone that I could feel over the proximal phalanx of the dislocated toe felt normal I cannot exclude a fracture.  I first performed a digital block for pain control and then did a liter of irrigation with the toe dislocated.  I felt this would provide the best cleaning.  I then reduced the toe adequately a dressing was applied he was sent  off for x-ray.  His open dislocation was reduced it was sutured I discussed the case with the orthopedist who concurred with outpatient follow-up the wound to be extensively irrigated and was discharged home in good condition.    Diagnosis:    ICD-10-CM    1. Open dislocation of phalanx of foot, left, initial encounter  S93.105A     S91.109A    2. Hypertension, unspecified type  I10    3. Chronic anticoagulation  Z79.01        Discharge Medications:  Discharge Medication List as of 5/22/2022  9:17 PM      START taking these medications    Details   acetaminophen-codeine (TYLENOL #3) 300-30 MG tablet Take 1 tablet by mouth every 6 hours as needed for severe pain, Disp-10 tablet, R-0, Local Print      amoxicillin-clavulanate (AUGMENTIN) 875-125 MG tablet Take 1 tablet by mouth 2 times daily, Disp-20 tablet, R-0, Local Print                    Pete Jimenes MD  05/24/22 [id number]

## 2023-02-24 ENCOUNTER — APPOINTMENT (OUTPATIENT)
Dept: GENERAL RADIOLOGY | Facility: CLINIC | Age: 88
End: 2023-02-24
Attending: EMERGENCY MEDICINE
Payer: COMMERCIAL

## 2023-02-24 ENCOUNTER — HOSPITAL ENCOUNTER (EMERGENCY)
Facility: CLINIC | Age: 88
Discharge: HOME | End: 2023-02-24
Attending: EMERGENCY MEDICINE | Admitting: EMERGENCY MEDICINE
Payer: COMMERCIAL

## 2023-02-24 VITALS
SYSTOLIC BLOOD PRESSURE: 140 MMHG | TEMPERATURE: 98 F | OXYGEN SATURATION: 96 % | RESPIRATION RATE: 18 BRPM | DIASTOLIC BLOOD PRESSURE: 95 MMHG | HEART RATE: 96 BPM

## 2023-02-24 DIAGNOSIS — S40.022S ARM BRUISE, LEFT, SEQUELA: ICD-10-CM

## 2023-02-24 DIAGNOSIS — M79.89 LEFT ARM SWELLING: ICD-10-CM
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
## 2023-02-24 DIAGNOSIS — S40.812A ABRASION OF LEFT UPPER EXTREMITY, INITIAL ENCOUNTER: ICD-10-CM

## 2023-02-24 PROCEDURE — 73090 X-RAY EXAM OF FOREARM: CPT | Mod: LT

## 2023-02-24 PROCEDURE — 99283 EMERGENCY DEPT VISIT LOW MDM: CPT

## 2023-02-24 ASSESSMENT — ACTIVITIES OF DAILY LIVING (ADL): ADLS_ACUITY_SCORE: 33

## 2023-02-24 ASSESSMENT — ENCOUNTER SYMPTOMS: COLOR CHANGE: 1

## 2023-02-24 NOTE — ED PROVIDER NOTES
History     Chief Complaint:  Arm Pain       The history is provided by the patient.      Seth Mcdaniels is a 87 year old male on Xarelto with a history of stroke, hypertension, and atrial fibrillation who presents with left arm pain after a fall 1 week ago. He fell asleep in his recliner and then fell forward. He has been taking Aspirin for pain control.     Independent Historian:   None - Patient Only    Review of External Notes: None     ROS:  Review of Systems   Musculoskeletal:        Left Arm swelling   Skin: Positive for color change.     Allergies:  Lisinopril     Medications:    Rivaroxaban  Atorvastatin   Xarelto     Past Medical History:    Depression  Hypertension  [place]  Osteoarthritis   Unspecified cerebral artery occlusion with cerebral infarction   Atrial fibrillation  Presbyacusis   GERD  Iron deficiency anemia     Past Surgical History:    Arthroplasty knee  Colonoscopy  Phacoemulsification x2     Social History:  He reports to the ED alone.   He is a .   [facility]     Physical Exam   No data found.     Physical Exam    HEENT:  mmm. Normal phonation.  Eyes: PERRL B/L  CV: Peripheral pulses in tact and regular  Resp: Speaking in full sentences without any respiratory distress  Musculoskeletal:  LUE    Mild forearm bony TTP  Notable LUE swelling from hand to upper arm  Skin: Warm, dry, well perfused. Dependent bruising in the LUE (proximal and distal) with a scab by the lateral part of the elbow  Neuro: Alert, no gross motor or sensory deficits  Psych: Calm      Emergency Department Course     Imaging:  Radius/Ulna XR,  PA &LAT, left   Final Result   IMPRESSION:   1.  Normal left radius and ulna.   2.  No fracture.   3.  Advanced thumb CMC degenerative arthrosis.   4.  Tiny radiodense foreign body in the soft tissues of the   distal-ulnar forearm.      CORY SNELL MD            SYSTEM ID:  [id number]         Report per radiology    Emergency Department Course &  Assessments:    Interventions:  Medications - No data to display     Independent Interpretation (X-rays, CTs, rhythm strip):  I independently reviewed XR results and agree with radiologist interpretation of left forearm XR.     Consultations/Discussion of Management or Tests:          Social Determinants of Health affecting care:   None    Disposition:  The patient was discharged to home.     Impression & Plan    CMS Diagnoses: None    Medical Decision Making:  This 57-year-old male patient presents to the ED due to left upper extremity pain.  Please see the HPI and exam for specifics.  The patient remained well in the ED.  Broad differential was considered and x-ray imaging was ordered before my evaluation.  Fortunately, no fracture is noted.  I think the patient symptoms stem from dependent swelling but also aspirin usage for pain control in conjunction with his Xarelto.  We discussed this and I encouraged the patient to not use aspirin for pain control as this will further prolong bleeding.  I think that elevation and Tylenol use would be a reasonable neck step for pain control.  The [facility] and I will encourage him to follow-up with his primary care clinic in about a week for reassessment.  He can always return with new or worsening symptoms.    Critical Care time:  was 0 minutes for this patient excluding procedures.    Diagnosis:    ICD-10-CM    1. Fall, initial encounter  W19.XXXA       2. Arm bruise, left, sequela  S40.022S       3. Abrasion of left upper extremity, initial encounter  S40.812A       4. Left arm swelling  M79.89            Discharge Medications:  New Prescriptions    No medications on file      Scribe Disclosure:   ANA FARMER, am serving as a scribe; to document services personally performed by Mehran Fried DO based on data collection and the provider's statements to me.     2/24/2023   Mehran Fried DO [place]  Mehran Delgado, DO  02/24/23 133       Mehran Fried, DO  02/24/23 0508

## 2023-02-24 NOTE — DISCHARGE INSTRUCTIONS
What do you do next:   Continue your home medications unless we have specifically changed them  You can use over-the-counter acetaminophen (Tylenol ) but not ibuprofen/aspirin/advil, etc for pain control.  Acetaminophen (Tylenol): Take 500 to 1000 mg by mouth every 6 hours as needed for fever or pain.  Do not take more than 4000 total milligrams of acetaminophen-containing products in a 24-hour timeframe.  Ibuprofen: Do not take.  Follow up as indicated below    When do you return: If you have uncontrollable pain despite elevation and Tylenol use, weakness in your extremity, or any other symptoms that concern you, please return to the ED for reevaluation.    Thank you for allowing us to care for you today.

## 2023-02-24 NOTE — ED TRIAGE NOTES
Fall 1 week ago. Large bruise to left arm. C/O pain in the arm, denies any other pain.   Pt on ney.

## 2024-07-29 ENCOUNTER — DOCUMENTATION ONLY (OUTPATIENT)
Dept: GERIATRICS | Facility: CLINIC | Age: 89
End: 2024-07-29

## 2024-07-29 ENCOUNTER — LAB REQUISITION (OUTPATIENT)
Dept: LAB | Facility: CLINIC | Age: 89
End: 2024-07-29

## 2024-07-29 ENCOUNTER — TRANSITIONAL CARE UNIT VISIT (OUTPATIENT)
Dept: GERIATRICS | Facility: CLINIC | Age: 89
End: 2024-07-29
Payer: COMMERCIAL

## 2024-07-29 VITALS
OXYGEN SATURATION: 91 % | BODY MASS INDEX: 45.1 KG/M2 | HEART RATE: 86 BPM | WEIGHT: 315 LBS | TEMPERATURE: 97.8 F | DIASTOLIC BLOOD PRESSURE: 73 MMHG | SYSTOLIC BLOOD PRESSURE: 112 MMHG | RESPIRATION RATE: 18 BRPM | HEIGHT: 70 IN

## 2024-07-29 DIAGNOSIS — J18.9 PNEUMONIA DUE TO INFECTIOUS ORGANISM, UNSPECIFIED LATERALITY, UNSPECIFIED PART OF LUNG: ICD-10-CM

## 2024-07-29 DIAGNOSIS — E78.5 DYSLIPIDEMIA: ICD-10-CM

## 2024-07-29 DIAGNOSIS — F41.9 ANXIETY: ICD-10-CM

## 2024-07-29 DIAGNOSIS — R53.81 PHYSICAL DECONDITIONING: ICD-10-CM

## 2024-07-29 DIAGNOSIS — I48.20 CHRONIC ATRIAL FIBRILLATION (H): ICD-10-CM

## 2024-07-29 DIAGNOSIS — E66.01 MORBID OBESITY (H): ICD-10-CM

## 2024-07-29 DIAGNOSIS — Z86.73 HISTORY OF CVA (CEREBROVASCULAR ACCIDENT): ICD-10-CM

## 2024-07-29 DIAGNOSIS — M17.12 OSTEOARTHRITIS OF LEFT KNEE, UNSPECIFIED OSTEOARTHRITIS TYPE: ICD-10-CM

## 2024-07-29 DIAGNOSIS — I10 ESSENTIAL HYPERTENSION: ICD-10-CM

## 2024-07-29 DIAGNOSIS — Z95.0 CARDIAC PACEMAKER IN SITU: ICD-10-CM

## 2024-07-29 DIAGNOSIS — U07.1 COVID-19: ICD-10-CM

## 2024-07-29 DIAGNOSIS — G47.33 OSA (OBSTRUCTIVE SLEEP APNEA): ICD-10-CM

## 2024-07-29 DIAGNOSIS — I25.10 CORONARY ARTERY DISEASE INVOLVING NATIVE HEART WITHOUT ANGINA PECTORIS, UNSPECIFIED VESSEL OR LESION TYPE: ICD-10-CM

## 2024-07-29 DIAGNOSIS — K59.01 SLOW TRANSIT CONSTIPATION: ICD-10-CM

## 2024-07-29 DIAGNOSIS — I50.33 ACUTE ON CHRONIC DIASTOLIC HEART FAILURE (H): ICD-10-CM

## 2024-07-29 DIAGNOSIS — U07.1 INFECTION DUE TO 2019 NOVEL CORONAVIRUS: Primary | ICD-10-CM

## 2024-07-29 DIAGNOSIS — F32.A DEPRESSION, UNSPECIFIED DEPRESSION TYPE: ICD-10-CM

## 2024-07-29 DIAGNOSIS — D64.9 ACUTE ON CHRONIC ANEMIA: ICD-10-CM

## 2024-07-29 DIAGNOSIS — T14.8XXA HEMATOMA: ICD-10-CM

## 2024-07-29 DIAGNOSIS — M70.21 OLECRANON BURSITIS OF RIGHT ELBOW: ICD-10-CM

## 2024-07-29 PROCEDURE — 87635 SARS-COV-2 COVID-19 AMP PRB: CPT | Performed by: NURSE PRACTITIONER

## 2024-07-29 PROCEDURE — 99310 SBSQ NF CARE HIGH MDM 45: CPT | Performed by: NURSE PRACTITIONER

## 2024-07-29 RX ORDER — CARBOXYMETHYLCELLULOSE SODIUM 5 MG/ML
2 SOLUTION/ DROPS OPHTHALMIC
COMMUNITY
Start: 2024-07-27

## 2024-07-29 RX ORDER — BUPROPION HYDROCHLORIDE 150 MG/1
150 TABLET ORAL EVERY MORNING
COMMUNITY

## 2024-07-29 RX ORDER — NYSTATIN 100000 [USP'U]/G
POWDER TOPICAL 2 TIMES DAILY
Status: ON HOLD | COMMUNITY
Start: 2024-07-27 | End: 2024-09-04

## 2024-07-29 RX ORDER — CLOBETASOL PROPIONATE 0.5 MG/G
OINTMENT TOPICAL 2 TIMES DAILY
COMMUNITY
Start: 2023-04-24

## 2024-07-29 RX ORDER — NITROGLYCERIN 0.4 MG/1
0.4 TABLET SUBLINGUAL EVERY 5 MIN PRN
COMMUNITY
Start: 2024-05-09

## 2024-07-29 RX ORDER — MUPIROCIN 20 MG/G
OINTMENT TOPICAL DAILY
COMMUNITY
Start: 2023-03-16

## 2024-07-29 RX ORDER — TRIAMCINOLONE ACETONIDE 1 MG/G
CREAM TOPICAL 2 TIMES DAILY
COMMUNITY
Start: 2023-09-25

## 2024-07-29 RX ORDER — METOPROLOL SUCCINATE 25 MG/1
25 TABLET, EXTENDED RELEASE ORAL EVERY MORNING
COMMUNITY
Start: 2024-02-22

## 2024-07-29 RX ORDER — GUAIFENESIN 600 MG/1
600 TABLET, EXTENDED RELEASE ORAL 2 TIMES DAILY
COMMUNITY
Start: 2024-07-27 | End: 2024-08-04

## 2024-07-29 RX ORDER — KETOCONAZOLE 20 MG/G
CREAM TOPICAL 2 TIMES DAILY
COMMUNITY
Start: 2023-04-24

## 2024-07-29 RX ORDER — POLYETHYLENE GLYCOL 3350 17 G/17G
17 POWDER, FOR SOLUTION ORAL DAILY PRN
COMMUNITY
Start: 2024-07-28

## 2024-07-29 RX ORDER — IPRATROPIUM BROMIDE AND ALBUTEROL SULFATE 2.5; .5 MG/3ML; MG/3ML
3 SOLUTION RESPIRATORY (INHALATION) EVERY 4 HOURS PRN
Status: ON HOLD | COMMUNITY
Start: 2024-07-27 | End: 2024-09-04

## 2024-07-29 RX ORDER — AMOXICILLIN 250 MG
1 CAPSULE ORAL 2 TIMES DAILY
COMMUNITY
Start: 2024-07-27 | End: 2024-08-04

## 2024-07-29 RX ORDER — ACETAMINOPHEN 325 MG/1
650 TABLET ORAL EVERY 4 HOURS PRN
COMMUNITY

## 2024-07-29 NOTE — LETTER
7/29/2024      Seth Mcdaniels  [address] [facility] [id number]        [facility] GERIATRICS    PRIMARY CARE PROVIDER AND CLINIC:  [facility], [address] [address] / [facility] [address]  Chief Complaint   Patient presents with     Hospital F/U      [facility] Medical Record Number:  [id number]  Place of Service where encounter took place:  [facility]  ([facility]) [[id number]]    Seth Mcdaniels  is a 88 year old  (1935), admitted to the above facility from  [facility]. Hospital stay 7/11/24 through 7/27/24..   HPI:    Past medical history significant for CHF, CAD, hypertension, dyslipidemia, atrial fibrillation, CVA, chronic anemia, depression, anxiety, KALEIGH, obesity    Summary of recent hospitalization:   Patient was hospitalized at [facility] from 7/11/2024 to 7/272024 for COVID-19, pneumonia, acute on chronic diastolic failure, fall.  Patient presented to the emergency department for evaluation after his knee gave out and he fell developing right elbow pain.  CT head negative, x-ray of right elbow showed no acute fracture, soft tissue swelling overlying the olecranon.  Left knee x-ray reveals moderate to severe osteoarthritic changes of left knee with near complete loss of medial joint space.  CT of left knee negative for fracture.  Ortho was consulted and recommended close monitoring for infection of right elbow. Reported that due to severe OA TKA would be ideal, but due to obesity is not recommended at this time-recommended follow-up outpatient.  Following 7/15 patient developed respiratory symptoms and was found to have COVID-19.  Chest x-ray on 7/16 revealed hypoinflated lungs with streaky bibasilar airspace opacities favoring subsegmental atelectasis.  He was also treated with cefuroxime for healthcare associated pneumonia.  Patient was diuresed for acute on chronic diastolic heart failure on 7/20 and transition back to oral diuretics prior  to discharge.  Patient later developed right upper abdominal pain and tenderness with area of bruising.  CT follow-up chest, abdomen and pelvis completed on 7/26 that revealed persistent basilar atelectasis and parenchymal scar, stable right basilar pulmonary nodule, no acute cardiac or pulmonary abnormality, no acute intra-abdominal abnormality, demonstration of 3.1 x 3.1 cm hematoma within the right rectus sheath without evidence of significant extension into subcutaneous tissue for contrast peritoneal space.  Hemoglobin at discharge trending down, 11.0 on 7/27/2024. Discharged to TCU for physical rehabilitation and medical management.     Of note there is no discharge summary available.    Reviewed ER note, H&P, consultant notes, recent daily notes, labs and imaging from recent hospitalization.    Today patient was seen for admission visit in the TCU.  He is oriented x 3. He lives in a house with his partner. He continues to have some shortness of breath and cough that is improving.  He does not use a CPAP at night.  Elbow pain has improved, he continues to have left knee pain, we did discuss scheduling cortisone injection, that his family had discussed yesterday with nursing, however patient reports these have not been effective in the past and does not want to do this right now.  He plans to follow-up with Ortho outpatient.  He weighs himself at home and reports generally around 347 lb. He reports that his appetite is down from normal, he is eating about half of what he eats at home.  He denies lightheadedness/dizziness, chest pain, dysuria/trouble urinating.  He denies any abdominal pain, was having this previously and has since resolved.  We also discussed following up with primary care provider to further discuss starting Ozempic for weight loss.  We discussed what to expect in the TCU.  Discussed with nursing today that vital signs are not being pulled in, only 1 set of vitals to review since patient was  admitted.    Reviewed facility EMR including medications, recent nursing progress notes, vital signs.      CODE STATUS/ADVANCE DIRECTIVES DISCUSSION:  DNR only  ALLERGIES:   Allergies   Allergen Reactions     Lisinopril Cough      PAST MEDICAL HISTORY:   Past Medical History:   Diagnosis Date     Depressive disorder, not elsewhere classified      History of tension headache      Hypertension      Obesity, unspecified      Obstructive sleep apnea (adult) (pediatric)      Osteoarthritis      Unspecified cerebral artery occlusion with cerebral infarction     no residual      PAST SURGICAL HISTORY:   has a past surgical history that includes colonoscopy; Phacoemulsification clear cornea with toric intraocular lens implant (9/27/2012); Phacoemulsification clear cornea with standard intraocular lens implant (11/29/2012); and Arthroplasty knee (Right, 7/17/2015).  FAMILY HISTORY: family history is not on file.  SOCIAL HISTORY:   reports that he has never smoked. His smokeless tobacco use includes chew. He reports current alcohol use. He reports that he does not use drugs.  Patient's living condition: lives alone    Post Discharge Medication Reconciliation Status:   MED REC REQUIRED  Post Medication Reconciliation Status:  Discharge medications reconciled and changed, see notes/orders       Current Outpatient Medications   Medication Sig Dispense Refill     acetaminophen (TYLENOL) 325 MG tablet Take 650 mg by mouth every 4 hours as needed for mild pain       aspirin (ASA) 81 MG EC tablet Take 1 tablet (81 mg) by mouth daily  0     atorvastatin (LIPITOR) 40 MG tablet Take 40 mg by mouth every evening       buPROPion (WELLBUTRIN XL) 150 MG 24 hr tablet Take 150 mg by mouth every morning for anxiousness associated with  depression.       carboxymethylcellulose (REFRESH PLUS) 0.5 % SOLN ophthalmic solution Place 2 drops into both eyes every 2 hours as needed for dry eyes       clobetasol (TEMOVATE) 0.05 % external ointment  Apply topically 2 times daily  Apply  to BLE topically two times a day for Rash and  nonspecific skin eruption. Tinea, pedis for 2 Weeks  Lower legs until follow up in 2 weeks.       ferrous sulfate (FEROSUL) 325 (65 Fe) MG tablet Take 325 mg by mouth daily (with breakfast)       Flaxseed, Linseed, (FLAXSEED OIL) 1000 MG CAPS Take 1,000 mg by mouth daily       furosemide (LASIX) 20 MG tablet Take 1 tablet (20 mg) by mouth daily as needed (for >2 pound weight gain over one day) (Patient taking differently: Take 60 mg by mouth daily) 15 tablet 0     guaiFENesin (MUCINEX) 600 MG 12 hr tablet Take 600 mg by mouth 2 times daily for cough for 7  Days       ipratropium - albuterol 0.5 mg/2.5 mg/3 mL (DUONEB) 0.5-2.5 (3) MG/3ML neb solution Take 3 mLs by nebulization every 4 hours as needed for wheezing or shortness of breath       ketoconazole (NIZORAL) 2 % external cream Apply topically 2 times daily Apply to Left upper  arm topically two times a day for skin infection due to  the fungus candida also apply to interdigital spaces       metoprolol succinate ER (TOPROL XL) 25 MG 24 hr tablet Take 25 mg by mouth every morning htn       Multiple Vitamins-Minerals (PRESERVISION AREDS 2 PO) Take 1 tablet by mouth 2 times daily       mupirocin (BACTROBAN) 2 % external ointment Apply topically 3 times daily Apply to right lower  extremity topically three times a day for cellulitis       nitroGLYcerin (NITROSTAT) 0.4 MG sublingual tablet Place 0.4 mg under the tongue every 5 minutes as needed for chest pain       nystatin (MYCOSTATIN) 910309 UNIT/GM external powder Apply topically 2 times daily Apply to Affected areas topically two  times a day for skin rash       polyethylene glycol (MIRALAX) 17 g packet Take 17 g by mouth every morning constipation       rivaroxaban ANTICOAGULANT (XARELTO) 20 MG TABS tablet Take 1 tablet (20 mg) by mouth daily (with dinner) 30 tablet 0     senna-docusate (SENOKOT-S/PERICOLACE) 8.6-50 MG tablet  "Take 1 tablet by mouth 2 times daily       SERTRALINE HCL PO Take 100 mg by mouth every morning        triamcinolone (KENALOG) 0.1 % external cream Apply  to lower legs topically two times a day every Mon,  Wed, Fri for inflammation of the skin due to an allergy       No current facility-administered medications for this visit.       ROS:  10 point ROS of systems including Constitutional, Eyes, Respiratory, Cardiovascular, Gastroenterology, Genitourinary, Integumentary, Musculoskeletal, Psychiatric were all negative except for pertinent positives noted in my HPI.    Vitals:  /73   Pulse 86   Temp 97.8  F (36.6  C)   Resp 18   Ht 1.778 m (5' 10\")   Wt (!) 162.4 kg (358 lb)   SpO2 91%   BMI 51.37 kg/m    Exam:  GENERAL APPEARANCE:  Alert, in NAD  HEENT: normocephalic, moist mucous membranes, nose without drainage or crusting  RESP:  respiratory effort normal, no respiratory distress, Lung sounds diminished, patient is on RA  CV: auscultation of heart done, rate and rhythm irregular.   ABDOMEN: obese, + bowel sounds, soft, nontender, no grimacing or guarding with palpation.  M/S:  no lower extremity edema  SKIN:  Inspection and palpation of skin and subcutaneous tissue: skin warm, dry without rashes  NEURO: cranial nerves 2-12 grossly intact and at patient's baseline; no facial asymmetry, no speech deficits and able to follow directions  PSYCH: oriented x 3, affect and mood normal      Lab/Diagnostic data:  Labs done in SNF are in [facility]. Please refer to them using [facility]/Care Everywhere. and Recent labs in [facility] reviewed by me today.     ASSESSMENT/PLAN:  COVID-19, tested positive on 7/16/2024  Healthcare associated pneumonia   Respiratory status is stable, does still have a cough and SOB that is improving  Plan: Continue DuoNebs every 4 hours as needed and guaifenesin 600 mg twice daily x 7 days.  Monitor respiratory status. Patient no longer requires precautions as he is out of window.    Acute on " chronic diastolic heart failure  Baseline weight at home around 347 lb, diuresed inpatient  Weight 358.2 lb today- suspect some difference with scale, no leg swelling  Plan: Continue Lasix 60 mg daily, metoprolol ER 25 mg daily. BMP 7/31. Daily weights. Notify provider with weight gain >2 lbs in a day, >5 lbs in on week. 2 gram Na diet.     Acute on chronic anemia  Rectal sheath hematoma measuring 3.1 x 3.1 cm from 7/26/2024  Baseline hemoglobin 12-13  Hemoglobin 11.0 on 7/27/2024  Plan: CBC 7/31. Continue ferrous sulfate 325 mg daily.    Right olecranon traumatic bursitis  Left knee osteoarthritis  Ortho consulted and noted that TKA would be ideal, but due to obesity is not recommended at this time-recommended follow-up outpatient  He reports elbow pain is improved  Plan: We discussed setting him up for cortisone injection and patient declined, stating they have not been effective in the past. Continue tylenol PRN for now. Follow up with ortho outpatient    Coronary artery disease  Essential hypertension  Dyslipidemia  /72  Plan: Continue furosemide 60 mg daily, metoprolol ER 25 mg daily.  Continue aspirin 81 mg daily, atorvastatin 40 mg at bedtime, and nitroglycerin as needed.    Atrial fibrillation  Heart rate 86  Plan: Continue rivaroxaban 20 mg daily and metoprolol ER 25 mg daily. Monitor heart rate.     History of CVA  Plan: Continue rivaroxaban 20 mg daily, atorvastatin 40 mg at bedtime and aspirin 81 mg daily.    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    Depression  Anxiety  Plan: Continue bupropion 150 mg daily and sertraline 100 mg daily.  Monitor mood and symptoms.  Consider ACP referral as needed.    Slow transit constipation  Last bowel movement was yesterday, feels slightly constipated  Plan: Continue MiraLAX 17 g daily and senna S1 tab twice daily.  Monitor bowels.    Morbid Obesity, BMI 51.37  KALEIGH  Complicates  care, does not use CPAP  Plan: Encourage weight loss.  Dietitian follows in the TCU.  Patient's family had asked nursing about starting Ozempic, discussed with patient he should follow-up regarding starting this outpatient with his PCP after he discharges from the TCU.    Physical deconditioning  Secondary to recent hospitalization, medical conditions as above  Plan: Encourage participation in physical therapy/occupational therapy for strengthening and deconditioning. Discharge planning per their recommendation. Social work to assist with d/c planning.      Disclaimer: This note may contain text created using speech-recognition software and may contain unintended word substitutions.        Total time spent with patient visit at the [facility] nursing [facility] was 49 minutes including patient visit, review of past records, medication reconciliation, review of admission orders, discussion with nursing staff.    Electronically signed by:  ANDREIA Bender CNP                    Sincerely,        ANDREIA Bender CNP

## 2024-07-29 NOTE — PATIENT INSTRUCTIONS
Lexi Mcdaniels  1935  1) CBC, BMP 7/31. Diagnosis: anemia, CHF  2) Please ensure all VS are being pulled into vital signs sheet. There is only 1 set of VS available since admission  ANDREIA Bender CNP on 7/29/2024 at 10:15 AM     impairments found/functional limitations in following categories/rehab potential/therapy frequency/predicted duration of therapy intervention/anticipated discharge recommendation

## 2024-07-29 NOTE — PROGRESS NOTES
[facility] GERIATRICS    PRIMARY CARE PROVIDER AND CLINIC:  [facility], [address] [facility] / [facility] [id number]  Chief Complaint   Patient presents with    Hospital F/U      [facility] Medical Record Number:  [id number]  Place of Service where encounter took place:  [facility]  ([facility]) [[id number]]    Seht Mcdaniels  is a 88 year old  (1935), admitted to the above facility from  [facility]. Hospital stay 7/11/24 through 7/27/24..   HPI:    Past medical history significant for CHF, CAD, hypertension, dyslipidemia, atrial fibrillation, CVA, chronic anemia, depression, anxiety, KALEIGH, obesity    Summary of recent hospitalization:   Patient was hospitalized at [facility] from 7/11/2024 to 7/272024 for COVID-19, pneumonia, acute on chronic diastolic failure, fall.  Patient presented to the emergency department for evaluation after his knee gave out and he fell developing right elbow pain.  CT head negative, x-ray of right elbow showed no acute fracture, soft tissue swelling overlying the olecranon.  Left knee x-ray reveals moderate to severe osteoarthritic changes of left knee with near complete loss of medial joint space.  CT of left knee negative for fracture.  Ortho was consulted and recommended close monitoring for infection of right elbow. Reported that due to severe OA TKA would be ideal, but due to obesity is not recommended at this time-recommended follow-up outpatient.  Following 7/15 patient developed respiratory symptoms and was found to have COVID-19.  Chest x-ray on 7/16 revealed hypoinflated lungs with streaky bibasilar airspace opacities favoring subsegmental atelectasis.  He was also treated with cefuroxime for healthcare associated pneumonia.  Patient was diuresed for acute on chronic diastolic heart failure on 7/20 and transition back to oral diuretics prior to discharge.  Patient later developed right upper abdominal pain and tenderness  with area of bruising.  CT follow-up chest, abdomen and pelvis completed on 7/26 that revealed persistent basilar atelectasis and parenchymal scar, stable right basilar pulmonary nodule, no acute cardiac or pulmonary abnormality, no acute intra-abdominal abnormality, demonstration of 3.1 x 3.1 cm hematoma within the right rectus sheath without evidence of significant extension into subcutaneous tissue for contrast peritoneal space.  Hemoglobin at discharge trending down, 11.0 on 7/27/2024. Discharged to TCU for physical rehabilitation and medical management.     Of note there is no discharge summary available.    Reviewed ER note, H&P, consultant notes, recent daily notes, labs and imaging from recent hospitalization.    Today patient was seen for admission visit in the TCU.  He is oriented x 3. He lives in a house with his partner. He continues to have some shortness of breath and cough that is improving.  He does not use a CPAP at night.  Elbow pain has improved, he continues to have left knee pain, we did discuss scheduling cortisone injection, that his family had discussed yesterday with nursing, however patient reports these have not been effective in the past and does not want to do this right now.  He plans to follow-up with Ortho outpatient.  He weighs himself at home and reports generally around 347 lb. He reports that his appetite is down from normal, he is eating about half of what he eats at home.  He denies lightheadedness/dizziness, chest pain, dysuria/trouble urinating.  He denies any abdominal pain, was having this previously and has since resolved.  We also discussed following up with primary care provider to further discuss starting Ozempic for weight loss.  We discussed what to expect in the TCU.  Discussed with nursing today that vital signs are not being pulled in, only 1 set of vitals to review since patient was admitted.    Reviewed facility EMR including medications, recent nursing progress  notes, vital signs.      CODE STATUS/ADVANCE DIRECTIVES DISCUSSION:  DNR only  ALLERGIES:   Allergies   Allergen Reactions    Lisinopril Cough      PAST MEDICAL HISTORY:   Past Medical History:   Diagnosis Date    Depressive disorder, not elsewhere classified     History of tension headache     Hypertension     Obesity, unspecified     Obstructive sleep apnea (adult) (pediatric)     Osteoarthritis     Unspecified cerebral artery occlusion with cerebral infarction     no residual      PAST SURGICAL HISTORY:   has a past surgical history that includes colonoscopy; Phacoemulsification clear cornea with toric intraocular lens implant (9/27/2012); Phacoemulsification clear cornea with standard intraocular lens implant (11/29/2012); and Arthroplasty knee (Right, 7/17/2015).  FAMILY HISTORY: family history is not on file.  SOCIAL HISTORY:   reports that he has never smoked. His smokeless tobacco use includes chew. He reports current alcohol use. He reports that he does not use drugs.  Patient's living condition: lives alone    Post Discharge Medication Reconciliation Status:   MED REC REQUIRED  Post Medication Reconciliation Status:  Discharge medications reconciled and changed, see notes/orders       Current Outpatient Medications   Medication Sig Dispense Refill    acetaminophen (TYLENOL) 325 MG tablet Take 650 mg by mouth every 4 hours as needed for mild pain      aspirin (ASA) 81 MG EC tablet Take 1 tablet (81 mg) by mouth daily  0    atorvastatin (LIPITOR) 40 MG tablet Take 40 mg by mouth every evening      buPROPion (WELLBUTRIN XL) 150 MG 24 hr tablet Take 150 mg by mouth every morning for anxiousness associated with  depression.      carboxymethylcellulose (REFRESH PLUS) 0.5 % SOLN ophthalmic solution Place 2 drops into both eyes every 2 hours as needed for dry eyes      clobetasol (TEMOVATE) 0.05 % external ointment Apply topically 2 times daily  Apply  to BLE topically two times a day for Rash and  nonspecific  skin eruption. Tinea, pedis for 2 Weeks  Lower legs until follow up in 2 weeks.      ferrous sulfate (FEROSUL) 325 (65 Fe) MG tablet Take 325 mg by mouth daily (with breakfast)      Flaxseed, Linseed, (FLAXSEED OIL) 1000 MG CAPS Take 1,000 mg by mouth daily      furosemide (LASIX) 20 MG tablet Take 1 tablet (20 mg) by mouth daily as needed (for >2 pound weight gain over one day) (Patient taking differently: Take 60 mg by mouth daily) 15 tablet 0    guaiFENesin (MUCINEX) 600 MG 12 hr tablet Take 600 mg by mouth 2 times daily for cough for 7  Days      ipratropium - albuterol 0.5 mg/2.5 mg/3 mL (DUONEB) 0.5-2.5 (3) MG/3ML neb solution Take 3 mLs by nebulization every 4 hours as needed for wheezing or shortness of breath      ketoconazole (NIZORAL) 2 % external cream Apply topically 2 times daily Apply to Left upper  arm topically two times a day for skin infection due to  the fungus candida also apply to interdigital spaces      metoprolol succinate ER (TOPROL XL) 25 MG 24 hr tablet Take 25 mg by mouth every morning htn      Multiple Vitamins-Minerals (PRESERVISION AREDS 2 PO) Take 1 tablet by mouth 2 times daily      mupirocin (BACTROBAN) 2 % external ointment Apply topically 3 times daily Apply to right lower  extremity topically three times a day for cellulitis      nitroGLYcerin (NITROSTAT) 0.4 MG sublingual tablet Place 0.4 mg under the tongue every 5 minutes as needed for chest pain      nystatin (MYCOSTATIN) 833498 UNIT/GM external powder Apply topically 2 times daily Apply to Affected areas topically two  times a day for skin rash      polyethylene glycol (MIRALAX) 17 g packet Take 17 g by mouth every morning constipation      rivaroxaban ANTICOAGULANT (XARELTO) 20 MG TABS tablet Take 1 tablet (20 mg) by mouth daily (with dinner) 30 tablet 0    senna-docusate (SENOKOT-S/PERICOLACE) 8.6-50 MG tablet Take 1 tablet by mouth 2 times daily      SERTRALINE HCL PO Take 100 mg by mouth every morning        "triamcinolone (KENALOG) 0.1 % external cream Apply  to lower legs topically two times a day every Mon,  Wed, Fri for inflammation of the skin due to an allergy       No current facility-administered medications for this visit.       ROS:  10 point ROS of systems including Constitutional, Eyes, Respiratory, Cardiovascular, Gastroenterology, Genitourinary, Integumentary, Musculoskeletal, Psychiatric were all negative except for pertinent positives noted in my HPI.    Vitals:  /73   Pulse 86   Temp 97.8  F (36.6  C)   Resp 18   Ht 1.778 m (5' 10\")   Wt (!) 162.4 kg (358 lb)   SpO2 91%   BMI 51.37 kg/m    Exam:  GENERAL APPEARANCE:  Alert, in NAD  HEENT: normocephalic, moist mucous membranes, nose without drainage or crusting  RESP:  respiratory effort normal, no respiratory distress, Lung sounds diminished, patient is on RA  CV: auscultation of heart done, rate and rhythm irregular.   ABDOMEN: obese, + bowel sounds, soft, nontender, no grimacing or guarding with palpation.  M/S:  no lower extremity edema  SKIN:  Inspection and palpation of skin and subcutaneous tissue: skin warm, dry without rashes  NEURO: cranial nerves 2-12 grossly intact and at patient's baseline; no facial asymmetry, no speech deficits and able to follow directions  PSYCH: oriented x 3, affect and mood normal      Lab/Diagnostic data:  Labs done in SNF are in [facility]. Please refer to them using [facility] Everywhere. and Recent labs in [facility] reviewed by me today.     ASSESSMENT/PLAN:  COVID-19, tested positive on 7/16/2024  Healthcare associated pneumonia   Respiratory status is stable, does still have a cough and SOB that is improving  Plan: Continue DuoNebs every 4 hours as needed and guaifenesin 600 mg twice daily x 7 days.  Monitor respiratory status. Patient no longer requires precautions as he is out of window.    Acute on chronic diastolic heart failure  Baseline weight at home around 347 lb, diuresed inpatient  Weight 358.2 " lb today- suspect some difference with scale, no leg swelling  Plan: Continue Lasix 60 mg daily, metoprolol ER 25 mg daily. BMP 7/31. Daily weights. Notify provider with weight gain >2 lbs in a day, >5 lbs in on week. 2 gram Na diet.     Acute on chronic anemia  Rectal sheath hematoma measuring 3.1 x 3.1 cm from 7/26/2024  Baseline hemoglobin 12-13  Hemoglobin 11.0 on 7/27/2024  Plan: CBC 7/31. Continue ferrous sulfate 325 mg daily.    Right olecranon traumatic bursitis  Left knee osteoarthritis  Ortho consulted and noted that TKA would be ideal, but due to obesity is not recommended at this time-recommended follow-up outpatient  He reports elbow pain is improved  Plan: We discussed setting him up for cortisone injection and patient declined, stating they have not been effective in the past. Continue tylenol PRN for now. Follow up with ortho outpatient    Coronary artery disease  Essential hypertension  Dyslipidemia  /72  Plan: Continue furosemide 60 mg daily, metoprolol ER 25 mg daily.  Continue aspirin 81 mg daily, atorvastatin 40 mg at bedtime, and nitroglycerin as needed.    Atrial fibrillation  Heart rate 86  Plan: Continue rivaroxaban 20 mg daily and metoprolol ER 25 mg daily. Monitor heart rate.     History of CVA  Plan: Continue rivaroxaban 20 mg daily, atorvastatin 40 mg at bedtime and aspirin 81 mg daily.    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    Depression  Anxiety  Plan: Continue bupropion 150 mg daily and sertraline 100 mg daily.  Monitor mood and symptoms.  Consider ACP referral as needed.    Slow transit constipation  Last bowel movement was yesterday, feels slightly constipated  Plan: Continue MiraLAX 17 g daily and senna S1 tab twice daily.  Monitor bowels.    Morbid Obesity, BMI 51.37  KALEIGH  Complicates care, does not use CPAP  Plan: Encourage weight loss.  Dietitian follows in the TCU.  Patient's family  had asked nursing about starting Ozempic, discussed with patient he should follow-up regarding starting this outpatient with his PCP after he discharges from the TCU.    Physical deconditioning  Secondary to recent hospitalization, medical conditions as above  Plan: Encourage participation in physical therapy/occupational therapy for strengthening and deconditioning. Discharge planning per their recommendation. Social work to assist with d/c planning.      Disclaimer: This note may contain text created using speech-recognition software and may contain unintended word substitutions.        Total time spent with patient visit at the skilled nursing facility was 49 minutes including patient visit, review of past records, medication reconciliation, review of admission orders, discussion with nursing staff.    Electronically signed by:  ANDREIA Bender CNP

## 2024-07-30 ENCOUNTER — LAB REQUISITION (OUTPATIENT)
Dept: LAB | Facility: CLINIC | Age: 89
End: 2024-07-30
Payer: COMMERCIAL

## 2024-07-30 DIAGNOSIS — D64.9 ANEMIA, UNSPECIFIED: ICD-10-CM

## 2024-07-30 DIAGNOSIS — I50.42 CHRONIC COMBINED SYSTOLIC (CONGESTIVE) AND DIASTOLIC (CONGESTIVE) HEART FAILURE (H): ICD-10-CM

## 2024-07-30 LAB — SARS-COV-2 RNA RESP QL NAA+PROBE: NEGATIVE

## 2024-07-31 VITALS
WEIGHT: 315 LBS | RESPIRATION RATE: 18 BRPM | SYSTOLIC BLOOD PRESSURE: 129 MMHG | HEART RATE: 63 BPM | TEMPERATURE: 98.2 F | BODY MASS INDEX: 45.1 KG/M2 | DIASTOLIC BLOOD PRESSURE: 78 MMHG | HEIGHT: 70 IN | OXYGEN SATURATION: 94 %

## 2024-07-31 LAB
ANION GAP SERPL CALCULATED.3IONS-SCNC: 14 MMOL/L (ref 7–15)
BUN SERPL-MCNC: 25.9 MG/DL (ref 8–23)
CALCIUM SERPL-MCNC: 9.1 MG/DL (ref 8.8–10.4)
CHLORIDE SERPL-SCNC: 98 MMOL/L (ref 98–107)
CREAT SERPL-MCNC: 1.19 MG/DL (ref 0.67–1.17)
EGFRCR SERPLBLD CKD-EPI 2021: 59 ML/MIN/1.73M2
ERYTHROCYTE [DISTWIDTH] IN BLOOD BY AUTOMATED COUNT: 12.7 % (ref 10–15)
GLUCOSE SERPL-MCNC: 94 MG/DL (ref 70–99)
HCO3 SERPL-SCNC: 27 MMOL/L (ref 22–29)
HCT VFR BLD AUTO: 35.3 % (ref 40–53)
HGB BLD-MCNC: 11.6 G/DL (ref 13.3–17.7)
MCH RBC QN AUTO: 31.6 PG (ref 26.5–33)
MCHC RBC AUTO-ENTMCNC: 32.9 G/DL (ref 31.5–36.5)
MCV RBC AUTO: 96 FL (ref 78–100)
PLATELET # BLD AUTO: 206 10E3/UL (ref 150–450)
POTASSIUM SERPL-SCNC: 4.1 MMOL/L (ref 3.4–5.3)
RBC # BLD AUTO: 3.67 10E6/UL (ref 4.4–5.9)
SODIUM SERPL-SCNC: 139 MMOL/L (ref 135–145)
WBC # BLD AUTO: 8.6 10E3/UL (ref 4–11)

## 2024-07-31 PROCEDURE — 36415 COLL VENOUS BLD VENIPUNCTURE: CPT | Mod: ORL | Performed by: NURSE PRACTITIONER

## 2024-07-31 PROCEDURE — 85027 COMPLETE CBC AUTOMATED: CPT | Mod: ORL | Performed by: NURSE PRACTITIONER

## 2024-07-31 PROCEDURE — 80048 BASIC METABOLIC PNL TOTAL CA: CPT | Mod: ORL | Performed by: NURSE PRACTITIONER

## 2024-07-31 NOTE — PROGRESS NOTES
[facility] GERIATRICS    Chief Complaint   Patient presents with    RECHECK     HPI:  Seth Mcdaniels is a 88 year old  (1935), who is being seen today for an episodic care visit at: [facility]  ([facility]) [[id number]].     Past medical history significant for CHF, CAD, hypertension, dyslipidemia, atrial fibrillation, CVA, chronic anemia, depression, anxiety, KALEIGH, obesity     Summary of recent hospitalization:   Patient was hospitalized at [facility] from 7/11/2024 to 7/272024 for COVID-19, pneumonia, acute on chronic diastolic failure, fall.  Patient presented to the emergency department for evaluation after his knee gave out and he fell developing right elbow pain.  CT head negative, x-ray of right elbow showed no acute fracture, soft tissue swelling overlying the olecranon.  Left knee x-ray reveals moderate to severe osteoarthritic changes of left knee with near complete loss of medial joint space.  CT of left knee negative for fracture.  Ortho was consulted and recommended close monitoring for infection of right elbow. Reported that due to severe OA TKA would be ideal, but due to obesity is not recommended at this time-recommended follow-up outpatient.  Following 7/15 patient developed respiratory symptoms and was found to have COVID-19.  Chest x-ray on 7/16 revealed hypoinflated lungs with streaky bibasilar airspace opacities favoring subsegmental atelectasis.  He was also treated with cefuroxime for healthcare associated pneumonia.  Patient was diuresed for acute on chronic diastolic heart failure on 7/20 and transition back to oral diuretics prior to discharge.  Patient later developed right upper abdominal pain and tenderness with area of bruising.  CT follow-up chest, abdomen and pelvis completed on 7/26 that revealed persistent basilar atelectasis and parenchymal scar, stable right basilar pulmonary nodule, no acute cardiac or pulmonary abnormality, no acute intra-abdominal  "abnormality, demonstration of 3.1 x 3.1 cm hematoma within the right rectus sheath without evidence of significant extension into subcutaneous tissue for contrast peritoneal space.  Hemoglobin at discharge trending down, 11.0 on 7/27/2024. Discharged to TCU for physical rehabilitation and medical management.     Today patient was seen for routine follow-up in the TCU.  He reports that he is feeling tired this morning, but he is doing okay.  He reports having some shortness of breath, does not feel like it is getting any worse or changed.  He continues to have a cough.  He denies any lightheadedness/dizziness, chest pain.  He reports that his appetite is not very good.  He denies dysuria/trouble urinating.  His bowels are moving regularly.  Reports leg swelling is better than at baseline. Patient continues to work with therapy.    Reviewed facility EMR including medications, recent nursing progress notes, vital signs.  Discussed plan of care with nursing.    Allergies, and PMH/PSH reviewed in EPIC today.  REVIEW OF SYSTEMS:  7 point ROS of systems including Constitutional, Respiratory, Cardiovascular, Gastroenterology, Genitourinary, Musculoskeletal, Psychiatric were all negative except for pertinent positives noted in my HPI.    Objective:   /78   Pulse 63   Temp 98.2  F (36.8  C)   Resp 18   Ht 1.778 m (5' 10\")   Wt (!) 162.4 kg (358 lb)   SpO2 94%   BMI 51.37 kg/m    GENERAL APPEARANCE:  Alert, in NAD  HEENT: normocephalic, moist mucous membranes, nose without drainage or crusting  RESP:  respiratory effort normal, no respiratory distress, Lung sounds clear, patient is on RA  CV: auscultation of heart done, rate and rhythm regular.   ABDOMEN: morbidly obese, + bowel sounds, soft, nontender, no grimacing or guarding with palpation.  M/S: 1+ bilateral lower extremity edema  NEURO: cranial nerves 2-12 grossly intact and at patient's baseline; no facial asymmetry, no speech deficits and able to follow " directions  PSYCH: affect and mood normal    Labs done in SNF are in [facility]. Please refer to them using [facility] Everywhere. and Recent labs in [facility] reviewed by me today.     Assessment/Plan:  COVID-19, tested positive on 7/16/2024  Healthcare associated pneumonia   Completed course of antibiotics inpatient  Respiratory status is stable, does report some shortness of breath but is no worse, and continues to have a cough  Plan: Continue DuoNebs every 4 hours as needed and guaifenesin 600 mg twice daily x 7 day-d discussed with nursing could try DuoNeb this morning.  Monitor respiratory status. Patient no longer requires precautions as he is out of window.     Acute on chronic diastolic heart failure  Baseline weight at home around 347 lb, diuresed inpatient  Weight 358.2 lb on 7/30/2024, +1 leg swelling- reports leg swelling is better than at baseline  Plan: Continue Lasix 60 mg daily, metoprolol ER 25 mg daily. BMP 7/31. Daily weights. Notify provider with weight gain >2 lbs in a day, >5 lbs in on week. 2 gram Na diet.     Acute on chronic anemia  Rectal sheath hematoma measuring 3.1 x 3.1 cm from 7/26/2024  Baseline hemoglobin 12-13  Hemoglobin 11.6 on 7/31/2024-trending up  Plan: CBC PRN. Continue ferrous sulfate 325 mg daily.     Right olecranon traumatic bursitis, improved  Left knee osteoarthritis  Ortho consulted and noted that TKA would be ideal, but due to obesity is not recommended at this time-recommended follow-up outpatient  Patient declined cortisone injection that family was requesting on admission reporting ineffective  Plan: Continue tylenol PRN for now. Follow up with ortho outpatient     Coronary artery disease  Essential hypertension  Dyslipidemia  -151  Plan: Continue furosemide 60 mg daily, metoprolol ER 25 mg daily.  Continue aspirin 81 mg daily, atorvastatin 40 mg at bedtime, and nitroglycerin as needed.     Atrial fibrillation  Heart rate 60-80S  Plan: Continue rivaroxaban 20  mg daily and metoprolol ER 25 mg daily. Monitor heart rate.      History of CVA  Plan: Continue rivaroxaban 20 mg daily, atorvastatin 40 mg at bedtime and aspirin 81 mg daily.     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     Chronic kidney disease stage 2  Baseline creatinine 0.8-1.0  Creatinine 1.19 on 7/31/2024  Plan: Ensure patient is staying hydrated, especially in setting of poor appetite.  BMP 8/5.    Depression  Anxiety  Plan: Continue bupropion 150 mg daily and sertraline 100 mg daily.  Monitor mood and symptoms.  Consider ACP referral as needed.     Slow transit constipation  Bowels are moving regularly  Plan: Continue MiraLAX 17 g daily and senna S1 tab twice daily.  Monitor bowels.     Morbid Obesity, BMI 51.37  KALEIGH  Complicates care, does not use CPAP  Plan: Encourage weight loss.  Dietitian follows in the TCU.  Follow-up with PCP outpatient to discuss starting Ozempic or alternative GLP1.      Cognitive impairment  SLUMS 24/30  Plan: OCCUPATIONAL THERAPY to complete cognitive testing for safe discharge planning. Nursing to assist with cares, meals, medication assistance, activities.    Physical deconditioning  Secondary to recent hospitalization, medical conditions as above  Patient continues to work with therapy  Plan: Encourage participation in physical therapy/occupational therapy for strengthening and deconditioning. Discharge planning per their recommendation. Social work to assist with d/c planning.         MED REC REQUIRED  Post Medication Reconciliation Status:  Medication reconciliation previously completed during another office visit      Disclaimer: This note may contain text created using speech-recognition software and may contain unintended word substitutions.       Electronically signed by: ANDREIA Bender CNP

## 2024-08-01 ENCOUNTER — TRANSITIONAL CARE UNIT VISIT (OUTPATIENT)
Dept: GERIATRICS | Facility: CLINIC | Age: 89
End: 2024-08-01
Payer: COMMERCIAL

## 2024-08-01 DIAGNOSIS — I25.10 CORONARY ARTERY DISEASE INVOLVING NATIVE HEART WITHOUT ANGINA PECTORIS, UNSPECIFIED VESSEL OR LESION TYPE: ICD-10-CM

## 2024-08-01 DIAGNOSIS — Z86.16 HISTORY OF COVID-19: Primary | ICD-10-CM

## 2024-08-01 DIAGNOSIS — E66.01 MORBID OBESITY (H): ICD-10-CM

## 2024-08-01 DIAGNOSIS — K59.01 SLOW TRANSIT CONSTIPATION: ICD-10-CM

## 2024-08-01 DIAGNOSIS — D64.9 ACUTE ON CHRONIC ANEMIA: ICD-10-CM

## 2024-08-01 DIAGNOSIS — I10 ESSENTIAL HYPERTENSION: ICD-10-CM

## 2024-08-01 DIAGNOSIS — Z87.01 HISTORY OF PNEUMONIA: ICD-10-CM

## 2024-08-01 DIAGNOSIS — N18.2 CKD (CHRONIC KIDNEY DISEASE) STAGE 2, GFR 60-89 ML/MIN: ICD-10-CM

## 2024-08-01 DIAGNOSIS — G47.33 OSA (OBSTRUCTIVE SLEEP APNEA): ICD-10-CM

## 2024-08-01 DIAGNOSIS — E78.5 DYSLIPIDEMIA: ICD-10-CM

## 2024-08-01 DIAGNOSIS — R53.81 PHYSICAL DECONDITIONING: ICD-10-CM

## 2024-08-01 DIAGNOSIS — Z95.0 CARDIAC PACEMAKER IN SITU: ICD-10-CM

## 2024-08-01 DIAGNOSIS — I48.20 CHRONIC ATRIAL FIBRILLATION (H): ICD-10-CM

## 2024-08-01 DIAGNOSIS — F41.9 ANXIETY: ICD-10-CM

## 2024-08-01 DIAGNOSIS — T14.8XXA HEMATOMA: ICD-10-CM

## 2024-08-01 DIAGNOSIS — F32.A DEPRESSION, UNSPECIFIED DEPRESSION TYPE: ICD-10-CM

## 2024-08-01 DIAGNOSIS — Z86.73 HISTORY OF CVA (CEREBROVASCULAR ACCIDENT): ICD-10-CM

## 2024-08-01 DIAGNOSIS — M17.12 OSTEOARTHRITIS OF LEFT KNEE, UNSPECIFIED OSTEOARTHRITIS TYPE: ICD-10-CM

## 2024-08-01 PROCEDURE — 99309 SBSQ NF CARE MODERATE MDM 30: CPT | Performed by: NURSE PRACTITIONER

## 2024-08-01 NOTE — LETTER
8/1/2024      Seth Mcdaniels  [address] [facility] [address]        [facility] GERIATRICS    Chief Complaint   Patient presents with     RECHECK     HPI:  Seth Mcdaniels is a 88 year old  (1935), who is being seen today for an episodic care visit at: [facility]  ([facility]) [[id number]].     Past medical history significant for CHF, CAD, hypertension, dyslipidemia, atrial fibrillation, CVA, chronic anemia, depression, anxiety, KALEIGH, obesity     Summary of recent hospitalization:   Patient was hospitalized at [facility] from 7/11/2024 to 7/272024 for COVID-19, pneumonia, acute on chronic diastolic failure, fall.  Patient presented to the emergency department for evaluation after his knee gave out and he fell developing right elbow pain.  CT head negative, x-ray of right elbow showed no acute fracture, soft tissue swelling overlying the olecranon.  Left knee x-ray reveals moderate to severe osteoarthritic changes of left knee with near complete loss of medial joint space.  CT of left knee negative for fracture.  Ortho was consulted and recommended close monitoring for infection of right elbow. Reported that due to severe OA TKA would be ideal, but due to obesity is not recommended at this time-recommended follow-up outpatient.  Following 7/15 patient developed respiratory symptoms and was found to have COVID-19.  Chest x-ray on 7/16 revealed hypoinflated lungs with streaky bibasilar airspace opacities favoring subsegmental atelectasis.  He was also treated with cefuroxime for healthcare associated pneumonia.  Patient was diuresed for acute on chronic diastolic heart failure on 7/20 and transition back to oral diuretics prior to discharge.  Patient later developed right upper abdominal pain and tenderness with area of bruising.  CT follow-up chest, abdomen and pelvis completed on 7/26 that revealed persistent basilar atelectasis and parenchymal scar, stable right basilar pulmonary  "nodule, no acute cardiac or pulmonary abnormality, no acute intra-abdominal abnormality, demonstration of 3.1 x 3.1 cm hematoma within the right rectus sheath without evidence of significant extension into subcutaneous tissue for contrast peritoneal space.  Hemoglobin at discharge trending down, 11.0 on 7/27/2024. Discharged to TCU for physical rehabilitation and medical management.     Today patient was seen for routine follow-up in the TCU.  He reports that he is feeling tired this morning, but he is doing okay.  He reports having some shortness of breath, does not feel like it is getting any worse or changed.  He continues to have a cough.  He denies any lightheadedness/dizziness, chest pain.  He reports that his appetite is not very good.  He denies dysuria/trouble urinating.  His bowels are moving regularly.  Reports leg swelling is better than at baseline. Patient continues to work with therapy.    Reviewed facility EMR including medications, recent nursing progress notes, vital signs.  Discussed plan of care with nursing.    Allergies, and PMH/PSH reviewed in EPIC today.  REVIEW OF SYSTEMS:  7 point ROS of systems including Constitutional, Respiratory, Cardiovascular, Gastroenterology, Genitourinary, Musculoskeletal, Psychiatric were all negative except for pertinent positives noted in my HPI.    Objective:   /78   Pulse 63   Temp 98.2  F (36.8  C)   Resp 18   Ht 1.778 m (5' 10\")   Wt (!) 162.4 kg (358 lb)   SpO2 94%   BMI 51.37 kg/m    GENERAL APPEARANCE:  Alert, in NAD  HEENT: normocephalic, moist mucous membranes, nose without drainage or crusting  RESP:  respiratory effort normal, no respiratory distress, Lung sounds clear, patient is on RA  CV: auscultation of heart done, rate and rhythm regular.   ABDOMEN: morbidly obese, + bowel sounds, soft, nontender, no grimacing or guarding with palpation.  M/S: 1+ bilateral lower extremity edema  NEURO: cranial nerves 2-12 grossly intact and at " patient's baseline; no facial asymmetry, no speech deficits and able to follow directions  PSYCH: affect and mood normal    Labs done in SNF are in [place] [facility]. Please refer to them using EPIC/Care Everywhere. and Recent labs in EPIC reviewed by me today.     Assessment/Plan:  COVID-19, tested positive on 7/16/2024  Healthcare associated pneumonia   Completed course of antibiotics inpatient  Respiratory status is stable, does report some shortness of breath but is no worse, and continues to have a cough  Plan: Continue DuoNebs every 4 hours as needed and guaifenesin 600 mg twice daily x 7 day-d discussed with nursing could try DuoNeb this morning.  Monitor respiratory status. Patient no longer requires precautions as he is out of window.     Acute on chronic diastolic heart failure  Baseline weight at home around 347 lb, diuresed inpatient  Weight 358.2 lb on 7/30/2024, +1 leg swelling- reports leg swelling is better than at baseline  Plan: Continue Lasix 60 mg daily, metoprolol ER 25 mg daily. BMP 7/31. Daily weights. Notify provider with weight gain >2 lbs in a day, >5 lbs in on week. 2 gram Na diet.     Acute on chronic anemia  Rectal sheath hematoma measuring 3.1 x 3.1 cm from 7/26/2024  Baseline hemoglobin 12-13  Hemoglobin 11.6 on 7/31/2024-trending up  Plan: CBC PRN. Continue ferrous sulfate 325 mg daily.     Right olecranon traumatic bursitis, improved  Left knee osteoarthritis  Ortho consulted and noted that TKA would be ideal, but due to obesity is not recommended at this time-recommended follow-up outpatient  Patient declined cortisone injection that family was requesting on admission reporting ineffective  Plan: Continue tylenol PRN for now. Follow up with ortho outpatient     Coronary artery disease  Essential hypertension  Dyslipidemia  -151  Plan: Continue furosemide 60 mg daily, metoprolol ER 25 mg daily.  Continue aspirin 81 mg daily, atorvastatin 40 mg at bedtime, and nitroglycerin as  needed.     Atrial fibrillation  Heart rate 60-80S  Plan: Continue rivaroxaban 20 mg daily and metoprolol ER 25 mg daily. Monitor heart rate.      History of CVA  Plan: Continue rivaroxaban 20 mg daily, atorvastatin 40 mg at bedtime and aspirin 81 mg daily.     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     Chronic kidney disease stage 2  Baseline creatinine 0.8-1.0  Creatinine 1.19 on 7/31/2024  Plan: Ensure patient is staying hydrated, especially in setting of poor appetite.  BMP 8/5.    Depression  Anxiety  Plan: Continue bupropion 150 mg daily and sertraline 100 mg daily.  Monitor mood and symptoms.  Consider ACP referral as needed.     Slow transit constipation  Bowels are moving regularly  Plan: Continue MiraLAX 17 g daily and senna S1 tab twice daily.  Monitor bowels.     Morbid Obesity, BMI 51.37  KALEIGH  Complicates care, does not use CPAP  Plan: Encourage weight loss.  Dietitian follows in the TCU.  Follow-up with PCP outpatient to discuss starting Ozempic or alternative GLP1.      Cognitive impairment  SLUMS 24/30  Plan: OCCUPATIONAL THERAPY to complete cognitive testing for safe discharge planning. Nursing to assist with cares, meals, medication assistance, activities.    Physical deconditioning  Secondary to recent hospitalization, medical conditions as above  Patient continues to work with therapy  Plan: Encourage participation in physical therapy/occupational therapy for strengthening and deconditioning. Discharge planning per their recommendation. Social work to assist with d/c planning.         MED REC REQUIRED  Post Medication Reconciliation Status:  Medication reconciliation previously completed during another office visit      Disclaimer: This note may contain text created using speech-recognition software and may contain unintended word substitutions.       Electronically signed by: ANDREIA Bender CNP          Sincerely,        ANDREIA Bender CNP

## 2024-08-01 NOTE — PATIENT INSTRUCTIONS
Orders  Seth Mcdaniels  1935  1) Ensure patient is staying hydrated, especially in setting of poor appetite.   2) BMP 8/5. dX: CKD  3) Consider offering neb for some SOB today  ANDREIA Bender CNP on 8/1/2024 at 9:57 AM

## 2024-08-02 VITALS — WEIGHT: 315 LBS | HEIGHT: 70 IN | BODY MASS INDEX: 45.1 KG/M2

## 2024-08-02 NOTE — PROGRESS NOTES
[facility] GERIATRICS DISCHARGE SUMMARY  PATIENT'S NAME: Seth Mcdaniels  YOB: 1935  MEDICAL RECORD NUMBER:  [id number]  Place of Service where encounter took place:  [facility]  ([facility]) [[id number]]    PRIMARY CARE PROVIDER AND CLINIC RESPONSIBLE AFTER TRANSFER:   [facility], [address] [facility] [address]     Transferring providers: ANDREIA Bender CNP, Eloy Loya MD  Recent Hospitalization/ED:  [facility] stay 7/11/24 to 7/27/24.  Date of SNF Admission: July 27, 2024  Date of SNF (anticipated) Discharge: August 05, 2024-insurance driven discharge  Discharged to: previous independent home  Cognitive Scores: SLUMS 24/30, CPT 5.2/5.6  Physical Function: Ambulating 150 feet with 2 wheeled walker contact-guard assist, transfers contact-guard assist with 2 wheeled walker, bed mobility moderately independent, completed 13 stairs with bilateral handrail contact-guard assist to standby assist, contact-guard assist for toileting, min assist for lower body dressing, set up for upper body dressing, independent for eating and grooming/hygiene, min assist for showering, 2 minutes standing tolerance    CODE STATUS/ADVANCE DIRECTIVES DISCUSSION:  No CPR- Do NOT Intubate   ALLERGIES: Lisinopril    NURSING FACILITY COURSE   ORDER CHANGES:  Added voltaren gel for knee pain    Past medical history significant for CHF, CAD, hypertension, dyslipidemia, atrial fibrillation, CVA, chronic anemia, depression, anxiety, KALEIGH, obesity     Summary of recent hospitalization:   Patient was hospitalized at [facility] from 7/11/2024 to 7/272024 for COVID-19, pneumonia, acute on chronic diastolic failure, fall.  Patient presented to the emergency department for evaluation after his knee gave out and he fell developing right elbow pain.  CT head negative, x-ray of right elbow showed no acute fracture, soft tissue swelling overlying the  olecranon.  Left knee x-ray reveals moderate to severe osteoarthritic changes of left knee with near complete loss of medial joint space.  CT of left knee negative for fracture.  Ortho was consulted and recommended close monitoring for infection of right elbow. Reported that due to severe OA TKA would be ideal, but due to obesity is not recommended at this time-recommended follow-up outpatient.  Following 7/15 patient developed respiratory symptoms and was found to have COVID-19.  Chest x-ray on 7/16 revealed hypoinflated lungs with streaky bibasilar airspace opacities favoring subsegmental atelectasis.  He was also treated with cefuroxime for healthcare associated pneumonia.  Patient was diuresed for acute on chronic diastolic heart failure on 7/20 and transition back to oral diuretics prior to discharge.  Patient later developed right upper abdominal pain and tenderness with area of bruising.  CT follow-up chest, abdomen and pelvis completed on 7/26 that revealed persistent basilar atelectasis and parenchymal scar, stable right basilar pulmonary nodule, no acute cardiac or pulmonary abnormality, no acute intra-abdominal abnormality, demonstration of 3.1 x 3.1 cm hematoma within the right rectus sheath without evidence of significant extension into subcutaneous tissue for contrast peritoneal space.  Hemoglobin at discharge trending down, 11.0 on 7/27/2024. Discharged to TCU for physical rehabilitation and medical management.     Summary of nursing facility stay:   Today patient was seen for discharge evaluation. Patient's son and daughter in law at bedside today. He filed an appeal and lost and then a second appeal was completed that is still pending. He denies SOB, CP, dizziness/lightheadedness. Does note he has gained some weight over the weekend, sounds like his appetite is a bit better than last week, though doesn't like the food here. He continues to have left knee pain, some right knee pain too- per son he  fell on that knee. Is able to move around- pain is intermittent.  He denies dysuria bowel medications changed as needed they have resolved.  We discussed follow-up with PCP and orthopedics outpatient.    Specific problems addressed in the TCU:  COVID-19, tested positive on 7/16/2024  Healthcare associated pneumonia   Completed course of antibiotics inpatient  Patient is no longer contagious  Respiratory status stable  Plan: Continue DuoNebs every 4 hours as needed.  Monitor respiratory status. Patient no longer requires precautions as he is out of window.     Acute on chronic diastolic heart failure  Baseline weight at home around 347 lb, diuresed inpatient  Weight 352 lb on 8/4/2024, leg swelling +1 bilaterally  Plan: Continue Lasix 60 mg daily, metoprolol ER 25 mg daily.  Daily weights. Notify provider with weight gain >2 lbs in a day, >5 lbs in on week. 2 gram Na diet.      Acute on chronic anemia  Rectal sheath hematoma measuring 3.1 x 3.1 cm from 7/26/2024  Baseline hemoglobin 12-13  Hemoglobin 11.6 on 7/31/2024-trending up  Plan: CBC PRN. Continue ferrous sulfate 325 mg daily.     Right olecranon traumatic bursitis, improved  Left knee osteoarthritis  Right knee pain- intermittent  Ortho consulted and noted that TKA would be ideal, but due to obesity is not recommended at this time-recommended follow-up outpatient  Patient declined cortisone injection that family was requesting on admission reporting ineffective  Pain ongoing, also noting some right knee pain today that is intermittent- does have a small bruise- no imaging done inpatient   Plan: Start voltaren gel PRN. Continue tylenol PRN for now. Follow up with ortho outpatient. Consider imaging to right knee if pain is ongoing.     Coronary artery disease  Essential hypertension  Dyslipidemia  -150  Plan: Continue furosemide 60 mg daily, metoprolol ER 25 mg daily.  Continue aspirin 81 mg daily, atorvastatin 40 mg at bedtime, and nitroglycerin as  needed.     Atrial fibrillation  Heart rate generally 60-70s  Plan: Continue rivaroxaban 20 mg daily and metoprolol ER 25 mg daily. Monitor heart rate.      History of CVA  Plan: Continue rivaroxaban 20 mg daily, atorvastatin 40 mg at bedtime and aspirin 81 mg daily.     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     Chronic kidney disease stage 2  Baseline creatinine 0.8-1.0  Creatinine 1.19 on 7/31/2024  Plan: BMP pending today. Avoid nephrotoxins. Renally dose medications as indicated.     Depression  Anxiety  Plan: Continue bupropion 150 mg daily and sertraline 100 mg daily.  Monitor mood and symptoms. Follow up with PCP outpatient     Slow transit constipation, resolved  Bowels moving loose  Plan: Change MiraLAX 17 g daily PRN and senna S1 tab BID PRN.  Monitor bowels.     Morbid Obesity, BMI 51.37  KALEIGH  Complicates care, does not use CPAP  Plan: Encourage weight loss. Follow-up with PCP outpatient to discuss starting Ozempic or alternative GLP1.       Cognitive impairment  SLUMS 24/30,CPT 5.2/5.6  Plan: Based on cognitive testing okay to live alone with weekly check-in to monitor for safety and dangerous effects of impulsive behavior.     Physical deconditioning  Secondary to recent hospitalization, medical conditions as above  Completed course of therapy in the TCU  Plan: Continue therapy through home care      Discharge Medications:  MED REC REQUIRED  Post Medication Reconciliation Status:  Medication reconciliation previously completed during another office visit     Current Outpatient Medications   Medication Sig Dispense Refill    acetaminophen (TYLENOL) 325 MG tablet Take 650 mg by mouth every 4 hours as needed for mild pain      aspirin (ASA) 81 MG EC tablet Take 1 tablet (81 mg) by mouth daily  0    atorvastatin (LIPITOR) 40 MG tablet Take 40 mg by mouth every evening      buPROPion (WELLBUTRIN XL) 150 MG 24 hr tablet Take  150 mg by mouth every morning for anxiousness associated with  depression.      carboxymethylcellulose (REFRESH PLUS) 0.5 % SOLN ophthalmic solution Place 2 drops into both eyes every 2 hours as needed for dry eyes      clobetasol (TEMOVATE) 0.05 % external ointment Apply topically 2 times daily  Apply  to BLE topically two times a day for Rash and  nonspecific skin eruption. Tinea, pedis for 2 Weeks  Lower legs until follow up in 2 weeks.      diclofenac (VOLTAREN) 1 % topical gel Apply 4 g topically 3 times daily Bilateral knees      ferrous sulfate (FEROSUL) 325 (65 Fe) MG tablet Take 325 mg by mouth daily (with breakfast)      Flaxseed, Linseed, (FLAXSEED OIL) 1000 MG CAPS Take 1,000 mg by mouth daily      furosemide (LASIX) 20 MG tablet Take 3 tablets (60 mg) by mouth daily      ipratropium - albuterol 0.5 mg/2.5 mg/3 mL (DUONEB) 0.5-2.5 (3) MG/3ML neb solution Take 3 mLs by nebulization every 4 hours as needed for wheezing or shortness of breath      ketoconazole (NIZORAL) 2 % external cream Apply topically 2 times daily Apply to Left upper  arm topically two times a day for skin infection due to  the fungus candida also apply to interdigital spaces      metoprolol succinate ER (TOPROL XL) 25 MG 24 hr tablet Take 25 mg by mouth every morning htn      Multiple Vitamins-Minerals (PRESERVISION AREDS 2 PO) Take 1 tablet by mouth 2 times daily      mupirocin (BACTROBAN) 2 % external ointment Apply topically 3 times daily Apply to right lower  extremity topically three times a day for cellulitis      nitroGLYcerin (NITROSTAT) 0.4 MG sublingual tablet Place 0.4 mg under the tongue every 5 minutes as needed for chest pain      nystatin (MYCOSTATIN) 513779 UNIT/GM external powder Apply topically 2 times daily Apply to Affected areas topically two  times a day for skin rash      polyethylene glycol (MIRALAX) 17 g packet Take 17 g by mouth every morning constipation      rivaroxaban ANTICOAGULANT (XARELTO) 20 MG TABS  "tablet Take 1 tablet (20 mg) by mouth daily (with dinner) 30 tablet 0    senna-docusate (SENOKOT-S/PERICOLACE) 8.6-50 MG tablet Take 1 tablet by mouth 2 times daily as needed for constipation      SERTRALINE HCL PO Take 100 mg by mouth every morning       triamcinolone (KENALOG) 0.1 % external cream Apply  to lower legs topically two times a day every Mon,  Wed, Fri for inflammation of the skin due to an allergy           Past Medical History:   Past Medical History:   Diagnosis Date    Depressive disorder, not elsewhere classified     History of tension headache     Hypertension     Obesity, unspecified     Obstructive sleep apnea (adult) (pediatric)     Osteoarthritis     Unspecified cerebral artery occlusion with cerebral infarction     no residual     Physical Exam:   Vitals: Ht 1.778 m (5' 10\")   Wt (!) 162.4 kg (358 lb)   BMI 51.37 kg/m    BMI: Body mass index is 51.37 kg/m .  GENERAL APPEARANCE:  Alert, in NAD  HEENT: normocephalic, moist mucous membranes, nose without drainage or crusting  RESP:  respiratory effort normal, no respiratory distress, Lung sounds clear, patient is on RA  CV: auscultation of heart done, rate and rhythm regular.   ABDOMEN: obese, + bowel sounds, soft, nontender, no grimacing or guarding with palpation.  M/S:  1+ bilateral lower extremity edema, moves right knee freely, no pain with palpation of right knee today  SKIN: small purple colored bruise to right lateral knee  NEURO: cranial nerves 2-12 grossly intact and at patient's baseline; no facial asymmetry, no speech deficits and able to follow directions  PSYCH:affect and mood normal      SNF labs: Labs done in SNF are in [facility]. Please refer to them using [employer]/Care Everywhere. and Recent labs in [facility] reviewed by me today.     DISCHARGE PLAN:  Follow up labs: BMP due 8/13 to be drawn by home care with results to PCP  Medical Follow Up:      Follow up with primary care provider in 1 week   Follow up with orthopedics to " discuss knee pain and cardiology as directed  Discharge Services: Home Care:  Occupational Therapy, Physical Therapy, Registered Nurse, Home Health Aide, and From:  [facility]  Discharge Instructions   Continue to follow your diet:  2 gram sodium.   Ensure Plus nutritional supplement recommended one time daily.   Weigh yourself daily in the morning and keep a record. Call your primary clinic: a) if you are more short of breath, or b) if your weight changes more than 2 pounds in one day or more than 5 pounds in one week.   Wound care to panus twice daily. Cleanse with bath wipes and pat dry. Dust nystatin powder to red moist skin until resolved  Monitor blister to posterior calf     TOTAL DISCHARGE TIME:   Greater than 30 minutes  Electronically signed by:  ANDREIA Bender CNP     Home care Face to Face documentation done in [facility] attached to Home care orders for [facility].

## 2024-08-04 ENCOUNTER — LAB REQUISITION (OUTPATIENT)
Dept: LAB | Facility: CLINIC | Age: 89
End: 2024-08-04
Payer: COMMERCIAL

## 2024-08-04 DIAGNOSIS — N18.9 CHRONIC KIDNEY DISEASE, UNSPECIFIED: ICD-10-CM

## 2024-08-04 RX ORDER — FUROSEMIDE 20 MG
60 TABLET ORAL DAILY
Status: ON HOLD
Start: 2024-08-04 | End: 2024-09-04

## 2024-08-04 RX ORDER — AMOXICILLIN 250 MG
1 CAPSULE ORAL 2 TIMES DAILY PRN
Status: SHIPPED
Start: 2024-08-04

## 2024-08-04 NOTE — PATIENT INSTRUCTIONS
[facility] Discharge Orders    Name: Seth Mcdaniels  : 1935  Planned Discharge Date: 2024  Discharged to: home    MEDICAL FOLLOW UP  Follow up with primary care provider in 1 week  Follow up with orthopedics to discuss knee pain and cardiology as directed    FUTURE LABS: BMP due  to be drawn by home care with results to PCP    ORDER CHANGES:  Added voltaren gel for knee pain    DISCHARGE MEDICATIONS:  The patient s pharmacy is authorized to dispense a 30-day supply of medications. Refill requests should be directed to the primary provider, [facility]  Current Outpatient Medications   Medication Sig Dispense Refill    acetaminophen (TYLENOL) 325 MG tablet Take 650 mg by mouth every 4 hours as needed for mild pain      aspirin (ASA) 81 MG EC tablet Take 1 tablet (81 mg) by mouth daily  0    atorvastatin (LIPITOR) 40 MG tablet Take 40 mg by mouth every evening      buPROPion (WELLBUTRIN XL) 150 MG 24 hr tablet Take 150 mg by mouth every morning for anxiousness associated with  depression.      carboxymethylcellulose (REFRESH PLUS) 0.5 % SOLN ophthalmic solution Place 2 drops into both eyes every 2 hours as needed for dry eyes      clobetasol (TEMOVATE) 0.05 % external ointment Apply topically 2 times daily  Apply  to BLE topically two times a day for Rash and  nonspecific skin eruption. Tinea, pedis for 2 Weeks  Lower legs until follow up in 2 weeks.      diclofenac (VOLTAREN) 1 % topical gel Apply 4 g topically 3 times daily Bilateral knees      ferrous sulfate (FEROSUL) 325 (65 Fe) MG tablet Take 325 mg by mouth daily (with breakfast)      Flaxseed, Linseed, (FLAXSEED OIL) 1000 MG CAPS Take 1,000 mg by mouth daily      furosemide (LASIX) 20 MG tablet Take 3 tablets (60 mg) by mouth daily      ipratropium - albuterol 0.5 mg/2.5 mg/3 mL (DUONEB) 0.5-2.5 (3) MG/3ML neb solution Take 3 mLs by nebulization every 4 hours as needed for wheezing or shortness of breath       ketoconazole (NIZORAL) 2 % external cream Apply topically 2 times daily Apply to Left upper  arm topically two times a day for skin infection due to  the fungus candida also apply to interdigital spaces      metoprolol succinate ER (TOPROL XL) 25 MG 24 hr tablet Take 25 mg by mouth every morning htn      Multiple Vitamins-Minerals (PRESERVISION AREDS 2 PO) Take 1 tablet by mouth 2 times daily      mupirocin (BACTROBAN) 2 % external ointment Apply topically 3 times daily Apply to right lower  extremity topically three times a day for cellulitis      nitroGLYcerin (NITROSTAT) 0.4 MG sublingual tablet Place 0.4 mg under the tongue every 5 minutes as needed for chest pain      nystatin (MYCOSTATIN) 394298 UNIT/GM external powder Apply topically 2 times daily Apply to Affected areas topically two  times a day for skin rash      polyethylene glycol (MIRALAX) 17 g packet Take 17 g by mouth every morning constipation      rivaroxaban ANTICOAGULANT (XARELTO) 20 MG TABS tablet Take 1 tablet (20 mg) by mouth daily (with dinner) 30 tablet 0    senna-docusate (SENOKOT-S/PERICOLACE) 8.6-50 MG tablet Take 1 tablet by mouth 2 times daily as needed for constipation      SERTRALINE HCL PO Take 100 mg by mouth every morning       triamcinolone (KENALOG) 0.1 % external cream Apply  to lower legs topically two times a day every Mon,  Wed, Fri for inflammation of the skin due to an allergy         SERVICES:  Home Care:  Occupational Therapy, Physical Therapy, Registered Nurse, Home Health Aide, and From:  [facility]    ADDITIONAL INSTRUCTIONS:  Continue to follow your diet:  2 gram sodium.   Ensure Plus nutritional supplement recommended one time daily.   Weigh yourself daily in the morning and keep a record. Call your primary clinic: a) if you are more short of breath, or b) if your weight changes more than 2 pounds in one day or more than 5 pounds in one week.   Wound care to panus twice daily. Cleanse with bath wipes and pat  dry. Dust nystatin powder to red moist skin until resolved  Monitor blister to posterior calf     ANDREIA Bender CNP  This document was electronically signed on August 4, 2024

## 2024-08-05 ENCOUNTER — DISCHARGE SUMMARY NURSING HOME (OUTPATIENT)
Dept: GERIATRICS | Facility: CLINIC | Age: 89
End: 2024-08-05
Payer: COMMERCIAL

## 2024-08-05 DIAGNOSIS — T14.8XXA HEMATOMA: ICD-10-CM

## 2024-08-05 DIAGNOSIS — Z87.01 HISTORY OF PNEUMONIA: ICD-10-CM

## 2024-08-05 DIAGNOSIS — G47.33 OSA (OBSTRUCTIVE SLEEP APNEA): ICD-10-CM

## 2024-08-05 DIAGNOSIS — Z86.16 HISTORY OF COVID-19: ICD-10-CM

## 2024-08-05 DIAGNOSIS — Z86.73 HISTORY OF CVA (CEREBROVASCULAR ACCIDENT): ICD-10-CM

## 2024-08-05 DIAGNOSIS — N18.2 CKD (CHRONIC KIDNEY DISEASE) STAGE 2, GFR 60-89 ML/MIN: ICD-10-CM

## 2024-08-05 DIAGNOSIS — I10 ESSENTIAL HYPERTENSION: ICD-10-CM

## 2024-08-05 DIAGNOSIS — M17.12 OSTEOARTHRITIS OF LEFT KNEE, UNSPECIFIED OSTEOARTHRITIS TYPE: Primary | ICD-10-CM

## 2024-08-05 DIAGNOSIS — Z95.0 CARDIAC PACEMAKER IN SITU: ICD-10-CM

## 2024-08-05 DIAGNOSIS — F41.9 ANXIETY: ICD-10-CM

## 2024-08-05 DIAGNOSIS — M25.561 ACUTE PAIN OF RIGHT KNEE: ICD-10-CM

## 2024-08-05 DIAGNOSIS — I48.20 CHRONIC ATRIAL FIBRILLATION (H): ICD-10-CM

## 2024-08-05 DIAGNOSIS — E78.5 DYSLIPIDEMIA: ICD-10-CM

## 2024-08-05 DIAGNOSIS — I25.10 CORONARY ARTERY DISEASE INVOLVING NATIVE HEART WITHOUT ANGINA PECTORIS, UNSPECIFIED VESSEL OR LESION TYPE: ICD-10-CM

## 2024-08-05 DIAGNOSIS — D64.9 ACUTE ON CHRONIC ANEMIA: ICD-10-CM

## 2024-08-05 DIAGNOSIS — F32.A DEPRESSION, UNSPECIFIED DEPRESSION TYPE: ICD-10-CM

## 2024-08-05 DIAGNOSIS — R53.81 PHYSICAL DECONDITIONING: ICD-10-CM

## 2024-08-05 DIAGNOSIS — E66.01 MORBID OBESITY (H): ICD-10-CM

## 2024-08-05 LAB
ANION GAP SERPL CALCULATED.3IONS-SCNC: 12 MMOL/L (ref 7–15)
BUN SERPL-MCNC: 18.1 MG/DL (ref 8–23)
CALCIUM SERPL-MCNC: 8.9 MG/DL (ref 8.8–10.4)
CHLORIDE SERPL-SCNC: 101 MMOL/L (ref 98–107)
CREAT SERPL-MCNC: 1.13 MG/DL (ref 0.67–1.17)
EGFRCR SERPLBLD CKD-EPI 2021: 63 ML/MIN/1.73M2
GLUCOSE SERPL-MCNC: 97 MG/DL (ref 70–99)
HCO3 SERPL-SCNC: 28 MMOL/L (ref 22–29)
POTASSIUM SERPL-SCNC: 4.2 MMOL/L (ref 3.4–5.3)
SODIUM SERPL-SCNC: 141 MMOL/L (ref 135–145)

## 2024-08-05 PROCEDURE — 99316 NF DSCHRG MGMT 30 MIN+: CPT | Performed by: NURSE PRACTITIONER

## 2024-08-05 PROCEDURE — 36415 COLL VENOUS BLD VENIPUNCTURE: CPT | Mod: ORL | Performed by: NURSE PRACTITIONER

## 2024-08-05 PROCEDURE — P9604 ONE-WAY ALLOW PRORATED TRIP: HCPCS | Mod: ORL | Performed by: NURSE PRACTITIONER

## 2024-08-05 PROCEDURE — 80048 BASIC METABOLIC PNL TOTAL CA: CPT | Mod: ORL | Performed by: NURSE PRACTITIONER

## 2024-08-05 NOTE — LETTER
8/5/2024      Seth Mcdaniels  [address] [facility] [address]        [facility] GERIATRICS DISCHARGE SUMMARY  PATIENT'S NAME: Seth Mcdaniels  YOB: 1935  MEDICAL RECORD NUMBER:  [id number]  Place of Service where encounter took place:  [facility]  ([facility]) [[id number]]    PRIMARY CARE PROVIDER AND CLINIC RESPONSIBLE AFTER TRANSFER:   [facility], [address] [facility] / [facility] [address]     Transferring providers: ANDREIA Bender CNP, Eloy Loya MD  Recent Hospitalization/ED:  [facility] stay 7/11/24 to 7/27/24.  Date of SNF Admission: July 27, 2024  Date of SNF (anticipated) Discharge: August 05, 2024-insurance driven discharge  Discharged to: previous independent home  Cognitive Scores: SLUMS 24/30, CPT 5.2/5.6  Physical Function: Ambulating 150 feet with 2 wheeled walker contact-guard assist, transfers contact-guard assist with 2 wheeled walker, bed mobility moderately independent, completed 13 stairs with bilateral handrail contact-guard assist to standby assist, contact-guard assist for toileting, min assist for lower body dressing, set up for upper body dressing, independent for eating and grooming/hygiene, min assist for showering, 2 minutes standing tolerance    CODE STATUS/ADVANCE DIRECTIVES DISCUSSION:  No CPR- Do NOT Intubate   ALLERGIES: Lisinopril    NURSING FACILITY COURSE   ORDER CHANGES:  Added voltaren gel for knee pain    Past medical history significant for CHF, CAD, hypertension, dyslipidemia, atrial fibrillation, CVA, chronic anemia, depression, anxiety, KALEIGH, obesity     Summary of recent hospitalization:   Patient was hospitalized at [facility] from 7/11/2024 to 7/272024 for COVID-19, pneumonia, acute on chronic diastolic failure, fall.  Patient presented to the emergency department for evaluation after his knee gave out and he fell developing right elbow pain.  CT head negative, x-ray of  right elbow showed no acute fracture, soft tissue swelling overlying the olecranon.  Left knee x-ray reveals moderate to severe osteoarthritic changes of left knee with near complete loss of medial joint space.  CT of left knee negative for fracture.  Ortho was consulted and recommended close monitoring for infection of right elbow. Reported that due to severe OA TKA would be ideal, but due to obesity is not recommended at this time-recommended follow-up outpatient.  Following 7/15 patient developed respiratory symptoms and was found to have COVID-19.  Chest x-ray on 7/16 revealed hypoinflated lungs with streaky bibasilar airspace opacities favoring subsegmental atelectasis.  He was also treated with cefuroxime for healthcare associated pneumonia.  Patient was diuresed for acute on chronic diastolic heart failure on 7/20 and transition back to oral diuretics prior to discharge.  Patient later developed right upper abdominal pain and tenderness with area of bruising.  CT follow-up chest, abdomen and pelvis completed on 7/26 that revealed persistent basilar atelectasis and parenchymal scar, stable right basilar pulmonary nodule, no acute cardiac or pulmonary abnormality, no acute intra-abdominal abnormality, demonstration of 3.1 x 3.1 cm hematoma within the right rectus sheath without evidence of significant extension into subcutaneous tissue for contrast peritoneal space.  Hemoglobin at discharge trending down, 11.0 on 7/27/2024. Discharged to TCU for physical rehabilitation and medical management.     Summary of nursing facility stay:   Today patient was seen for discharge evaluation. Patient's son and daughter in law at bedside today. He filed an appeal and lost and then a second appeal was completed that is still pending. He denies SOB, CP, dizziness/lightheadedness. Does note he has gained some weight over the weekend, sounds like his appetite is a bit better than last week, though doesn't like the food here. He  continues to have left knee pain, some right knee pain too- per son he fell on that knee. Is able to move around- pain is intermittent.  He denies dysuria bowel medications changed as needed they have resolved.  We discussed follow-up with PCP and orthopedics outpatient.    Specific problems addressed in the TCU:  COVID-19, tested positive on 7/16/2024  Healthcare associated pneumonia   Completed course of antibiotics inpatient  Patient is no longer contagious  Respiratory status stable  Plan: Continue DuoNebs every 4 hours as needed.  Monitor respiratory status. Patient no longer requires precautions as he is out of window.     Acute on chronic diastolic heart failure  Baseline weight at home around 347 lb, diuresed inpatient  Weight 352 lb on 8/4/2024, leg swelling +1 bilaterally  Plan: Continue Lasix 60 mg daily, metoprolol ER 25 mg daily.  Daily weights. Notify provider with weight gain >2 lbs in a day, >5 lbs in on week. 2 gram Na diet.      Acute on chronic anemia  Rectal sheath hematoma measuring 3.1 x 3.1 cm from 7/26/2024  Baseline hemoglobin 12-13  Hemoglobin 11.6 on 7/31/2024-trending up  Plan: CBC PRN. Continue ferrous sulfate 325 mg daily.     Right olecranon traumatic bursitis, improved  Left knee osteoarthritis  Right knee pain- intermittent  Ortho consulted and noted that TKA would be ideal, but due to obesity is not recommended at this time-recommended follow-up outpatient  Patient declined cortisone injection that family was requesting on admission reporting ineffective  Pain ongoing, also noting some right knee pain today that is intermittent- does have a small bruise- no imaging done inpatient   Plan: Start voltaren gel PRN. Continue tylenol PRN for now. Follow up with ortho outpatient. Consider imaging to right knee if pain is ongoing.     Coronary artery disease  Essential hypertension  Dyslipidemia  -150  Plan: Continue furosemide 60 mg daily, metoprolol ER 25 mg daily.  Continue  aspirin 81 mg daily, atorvastatin 40 mg at bedtime, and nitroglycerin as needed.     Atrial fibrillation  Heart rate generally 60-70s  Plan: Continue rivaroxaban 20 mg daily and metoprolol ER 25 mg daily. Monitor heart rate.      History of CVA  Plan: Continue rivaroxaban 20 mg daily, atorvastatin 40 mg at bedtime and aspirin 81 mg daily.     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     Chronic kidney disease stage 2  Baseline creatinine 0.8-1.0  Creatinine 1.19 on 7/31/2024  Plan: BMP pending today. Avoid nephrotoxins. Renally dose medications as indicated.     Depression  Anxiety  Plan: Continue bupropion 150 mg daily and sertraline 100 mg daily.  Monitor mood and symptoms. Follow up with PCP outpatient     Slow transit constipation, resolved  Bowels moving loose  Plan: Change MiraLAX 17 g daily PRN and senna S1 tab BID PRN.  Monitor bowels.     Morbid Obesity, BMI 51.37  KALEIGH  Complicates care, does not use CPAP  Plan: Encourage weight loss. Follow-up with PCP outpatient to discuss starting Ozempic or alternative GLP1.       Cognitive impairment  SLUMS 24/30,CPT 5.2/5.6  Plan: Based on cognitive testing okay to live alone with weekly check-in to monitor for safety and dangerous effects of impulsive behavior.     Physical deconditioning  Secondary to recent hospitalization, medical conditions as above  Completed course of therapy in the TCU  Plan: Continue therapy through home care      Discharge Medications:  MED REC REQUIRED  Post Medication Reconciliation Status:  Medication reconciliation previously completed during another office visit     Current Outpatient Medications   Medication Sig Dispense Refill     acetaminophen (TYLENOL) 325 MG tablet Take 650 mg by mouth every 4 hours as needed for mild pain       aspirin (ASA) 81 MG EC tablet Take 1 tablet (81 mg) by mouth daily  0     atorvastatin (LIPITOR) 40 MG tablet Take 40 mg by  mouth every evening       buPROPion (WELLBUTRIN XL) 150 MG 24 hr tablet Take 150 mg by mouth every morning for anxiousness associated with  depression.       carboxymethylcellulose (REFRESH PLUS) 0.5 % SOLN ophthalmic solution Place 2 drops into both eyes every 2 hours as needed for dry eyes       clobetasol (TEMOVATE) 0.05 % external ointment Apply topically 2 times daily  Apply  to BLE topically two times a day for Rash and  nonspecific skin eruption. Tinea, pedis for 2 Weeks  Lower legs until follow up in 2 weeks.       diclofenac (VOLTAREN) 1 % topical gel Apply 4 g topically 3 times daily Bilateral knees       ferrous sulfate (FEROSUL) 325 (65 Fe) MG tablet Take 325 mg by mouth daily (with breakfast)       Flaxseed, Linseed, (FLAXSEED OIL) 1000 MG CAPS Take 1,000 mg by mouth daily       furosemide (LASIX) 20 MG tablet Take 3 tablets (60 mg) by mouth daily       ipratropium - albuterol 0.5 mg/2.5 mg/3 mL (DUONEB) 0.5-2.5 (3) MG/3ML neb solution Take 3 mLs by nebulization every 4 hours as needed for wheezing or shortness of breath       ketoconazole (NIZORAL) 2 % external cream Apply topically 2 times daily Apply to Left upper  arm topically two times a day for skin infection due to  the fungus candida also apply to interdigital spaces       metoprolol succinate ER (TOPROL XL) 25 MG 24 hr tablet Take 25 mg by mouth every morning htn       Multiple Vitamins-Minerals (PRESERVISION AREDS 2 PO) Take 1 tablet by mouth 2 times daily       mupirocin (BACTROBAN) 2 % external ointment Apply topically 3 times daily Apply to right lower  extremity topically three times a day for cellulitis       nitroGLYcerin (NITROSTAT) 0.4 MG sublingual tablet Place 0.4 mg under the tongue every 5 minutes as needed for chest pain       nystatin (MYCOSTATIN) 071618 UNIT/GM external powder Apply topically 2 times daily Apply to Affected areas topically two  times a day for skin rash       polyethylene glycol (MIRALAX) 17 g packet Take 17 g  "by mouth every morning constipation       rivaroxaban ANTICOAGULANT (XARELTO) 20 MG TABS tablet Take 1 tablet (20 mg) by mouth daily (with dinner) 30 tablet 0     senna-docusate (SENOKOT-S/PERICOLACE) 8.6-50 MG tablet Take 1 tablet by mouth 2 times daily as needed for constipation       SERTRALINE HCL PO Take 100 mg by mouth every morning        triamcinolone (KENALOG) 0.1 % external cream Apply  to lower legs topically two times a day every Mon,  Wed, Fri for inflammation of the skin due to an allergy           Past Medical History:   Past Medical History:   Diagnosis Date     Depressive disorder, not elsewhere classified      History of tension headache      Hypertension      Obesity, unspecified      Obstructive sleep apnea (adult) (pediatric)      Osteoarthritis      Unspecified cerebral artery occlusion with cerebral infarction     no residual     Physical Exam:   Vitals: Ht 1.778 m (5' 10\")   Wt (!) 162.4 kg (358 lb)   BMI 51.37 kg/m    BMI: Body mass index is 51.37 kg/m .  GENERAL APPEARANCE:  Alert, in NAD  HEENT: normocephalic, moist mucous membranes, nose without drainage or crusting  RESP:  respiratory effort normal, no respiratory distress, Lung sounds clear, patient is on RA  CV: auscultation of heart done, rate and rhythm regular.   ABDOMEN: obese, + bowel sounds, soft, nontender, no grimacing or guarding with palpation.  M/S:  1+ bilateral lower extremity edema, moves right knee freely, no pain with palpation of right knee today  SKIN: small purple colored bruise to right lateral knee  NEURO: cranial nerves 2-12 grossly intact and at patient's baseline; no facial asymmetry, no speech deficits and able to follow directions  PSYCH:affect and mood normal      SNF labs: Labs done in SNF are in [facility]. Please refer to them using [employer] Everywhere. and Recent labs in [facility] reviewed by me today.     DISCHARGE PLAN:  Follow up labs: BMP due 8/13 to be drawn by home care with results to PCP  Medical " Follow Up:      Follow up with primary care provider in 1 week   Follow up with orthopedics to discuss knee pain and cardiology as directed  Discharge Services: Home Care:  Occupational Therapy, Physical Therapy, Registered Nurse, Home Health Aide, and From:  [facility]  Discharge Instructions   Continue to follow your diet:  2 gram sodium.   Ensure Plus nutritional supplement recommended one time daily.   Weigh yourself daily in the morning and keep a record. Call your primary clinic: a) if you are more short of breath, or b) if your weight changes more than 2 pounds in one day or more than 5 pounds in one week.   Wound care to panus twice daily. Cleanse with bath wipes and pat dry. Dust nystatin powder to red moist skin until resolved  Monitor blister to posterior calf     TOTAL DISCHARGE TIME:   Greater than 30 minutes  Electronically signed by:  ANDREIA Bender CNP     Home care Face to Face documentation done in [facility] attached to Home care orders for [facility].               Sincerely,        ANDREIA Bender CNP

## 2024-09-03 ENCOUNTER — HOSPITAL ENCOUNTER (INPATIENT)
Facility: CLINIC | Age: 89
LOS: 3 days | Discharge: HOME HEALTH | DRG: 872 | End: 2024-09-06
Admitting: INTERNAL MEDICINE
Payer: COMMERCIAL

## 2024-09-03 DIAGNOSIS — A41.9 SEPSIS WITHOUT ACUTE ORGAN DYSFUNCTION, DUE TO UNSPECIFIED ORGANISM (H): ICD-10-CM

## 2024-09-03 DIAGNOSIS — D72.829 LEUKOCYTOSIS, UNSPECIFIED TYPE: ICD-10-CM

## 2024-09-03 DIAGNOSIS — L03.116 CELLULITIS OF LEFT LOWER LEG: ICD-10-CM

## 2024-09-03 DIAGNOSIS — T14.8XXA OPEN WOUND: Primary | ICD-10-CM

## 2024-09-03 DIAGNOSIS — N17.9 AKI (ACUTE KIDNEY INJURY) (H): ICD-10-CM

## 2024-09-03 LAB
ALBUMIN SERPL BCG-MCNC: 3.4 G/DL (ref 3.5–5.2)
ALP SERPL-CCNC: 71 U/L (ref 40–150)
ALT SERPL W P-5'-P-CCNC: 11 U/L (ref 0–70)
ANION GAP SERPL CALCULATED.3IONS-SCNC: 12 MMOL/L (ref 7–15)
ANION GAP SERPL CALCULATED.3IONS-SCNC: 13 MMOL/L (ref 7–15)
AST SERPL W P-5'-P-CCNC: 18 U/L (ref 0–45)
ATRIAL RATE - MUSE: 81 BPM
BILIRUB SERPL-MCNC: 0.9 MG/DL
BUN SERPL-MCNC: 22.6 MG/DL (ref 8–23)
BUN SERPL-MCNC: 23.3 MG/DL (ref 8–23)
CALCIUM SERPL-MCNC: 8.5 MG/DL (ref 8.8–10.4)
CALCIUM SERPL-MCNC: 8.8 MG/DL (ref 8.8–10.4)
CHLORIDE SERPL-SCNC: 101 MMOL/L (ref 98–107)
CHLORIDE SERPL-SCNC: 99 MMOL/L (ref 98–107)
CREAT SERPL-MCNC: 1.28 MG/DL (ref 0.67–1.17)
CREAT SERPL-MCNC: 1.3 MG/DL (ref 0.67–1.17)
DIASTOLIC BLOOD PRESSURE - MUSE: NORMAL MMHG
EGFRCR SERPLBLD CKD-EPI 2021: 53 ML/MIN/1.73M2
EGFRCR SERPLBLD CKD-EPI 2021: 54 ML/MIN/1.73M2
GLUCOSE SERPL-MCNC: 100 MG/DL (ref 70–99)
GLUCOSE SERPL-MCNC: 112 MG/DL (ref 70–99)
HCO3 BLDV-SCNC: 27 MMOL/L (ref 21–28)
HCO3 SERPL-SCNC: 21 MMOL/L (ref 22–29)
HCO3 SERPL-SCNC: 23 MMOL/L (ref 22–29)
HOLD SPECIMEN: NORMAL
INTERPRETATION ECG - MUSE: NORMAL
LACTATE BLD-SCNC: 1.5 MMOL/L
LACTATE SERPL-SCNC: 1.7 MMOL/L (ref 0.7–2)
P AXIS - MUSE: 70 DEGREES
PCO2 BLDV: 44 MM HG (ref 40–50)
PH BLDV: 7.4 [PH] (ref 7.32–7.43)
PO2 BLDV: 32 MM HG (ref 25–47)
POTASSIUM SERPL-SCNC: 4.3 MMOL/L (ref 3.4–5.3)
POTASSIUM SERPL-SCNC: 4.4 MMOL/L (ref 3.4–5.3)
PR INTERVAL - MUSE: 218 MS
PROT SERPL-MCNC: 6.1 G/DL (ref 6.4–8.3)
QRS DURATION - MUSE: 106 MS
QT - MUSE: 392 MS
QTC - MUSE: 455 MS
R AXIS - MUSE: 7 DEGREES
SAO2 % BLDV: 60 % (ref 70–75)
SODIUM SERPL-SCNC: 133 MMOL/L (ref 135–145)
SODIUM SERPL-SCNC: 136 MMOL/L (ref 135–145)
SYSTOLIC BLOOD PRESSURE - MUSE: NORMAL MMHG
T AXIS - MUSE: 184 DEGREES
VENTRICULAR RATE- MUSE: 81 BPM

## 2024-09-03 PROCEDURE — 99285 EMERGENCY DEPT VISIT HI MDM: CPT | Mod: 25

## 2024-09-03 PROCEDURE — 999N000157 HC STATISTIC RCP TIME EA 10 MIN

## 2024-09-03 PROCEDURE — 87040 BLOOD CULTURE FOR BACTERIA: CPT

## 2024-09-03 PROCEDURE — 36415 COLL VENOUS BLD VENIPUNCTURE: CPT

## 2024-09-03 PROCEDURE — 250N000013 HC RX MED GY IP 250 OP 250 PS 637: Performed by: INTERNAL MEDICINE

## 2024-09-03 PROCEDURE — 80053 COMPREHEN METABOLIC PANEL: CPT

## 2024-09-03 PROCEDURE — 93005 ELECTROCARDIOGRAM TRACING: CPT

## 2024-09-03 PROCEDURE — 120N000001 HC R&B MED SURG/OB

## 2024-09-03 PROCEDURE — 96365 THER/PROPH/DIAG IV INF INIT: CPT

## 2024-09-03 PROCEDURE — 36415 COLL VENOUS BLD VENIPUNCTURE: CPT | Performed by: EMERGENCY MEDICINE

## 2024-09-03 PROCEDURE — 999N000156 HC STATISTIC RCP CONSULT EA 30 MIN

## 2024-09-03 PROCEDURE — 258N000003 HC RX IP 258 OP 636

## 2024-09-03 PROCEDURE — 99222 1ST HOSP IP/OBS MODERATE 55: CPT | Performed by: INTERNAL MEDICINE

## 2024-09-03 PROCEDURE — 82803 BLOOD GASES ANY COMBINATION: CPT

## 2024-09-03 PROCEDURE — 83605 ASSAY OF LACTIC ACID: CPT

## 2024-09-03 PROCEDURE — 85004 AUTOMATED DIFF WBC COUNT: CPT

## 2024-09-03 PROCEDURE — 85007 BL SMEAR W/DIFF WBC COUNT: CPT

## 2024-09-03 PROCEDURE — 250N000011 HC RX IP 250 OP 636

## 2024-09-03 PROCEDURE — 82374 ASSAY BLOOD CARBON DIOXIDE: CPT

## 2024-09-03 RX ORDER — ASPIRIN 81 MG/1
81 TABLET ORAL DAILY
Status: DISCONTINUED | OUTPATIENT
Start: 2024-09-03 | End: 2024-09-06 | Stop reason: HOSPADM

## 2024-09-03 RX ORDER — ACETAMINOPHEN 650 MG/1
650 SUPPOSITORY RECTAL EVERY 4 HOURS PRN
Status: DISCONTINUED | OUTPATIENT
Start: 2024-09-03 | End: 2024-09-06 | Stop reason: HOSPADM

## 2024-09-03 RX ORDER — ALBUTEROL SULFATE 0.83 MG/ML
2.5 SOLUTION RESPIRATORY (INHALATION)
Status: DISCONTINUED | OUTPATIENT
Start: 2024-09-03 | End: 2024-09-06 | Stop reason: HOSPADM

## 2024-09-03 RX ORDER — NITROGLYCERIN 0.4 MG/1
0.4 TABLET SUBLINGUAL EVERY 5 MIN PRN
Status: DISCONTINUED | OUTPATIENT
Start: 2024-09-03 | End: 2024-09-06 | Stop reason: HOSPADM

## 2024-09-03 RX ORDER — ONDANSETRON 4 MG/1
4 TABLET, ORALLY DISINTEGRATING ORAL EVERY 6 HOURS PRN
Status: DISCONTINUED | OUTPATIENT
Start: 2024-09-03 | End: 2024-09-06 | Stop reason: HOSPADM

## 2024-09-03 RX ORDER — ATORVASTATIN CALCIUM 40 MG/1
40 TABLET, FILM COATED ORAL EVERY EVENING
Status: DISCONTINUED | OUTPATIENT
Start: 2024-09-03 | End: 2024-09-06 | Stop reason: HOSPADM

## 2024-09-03 RX ORDER — LIDOCAINE 40 MG/G
CREAM TOPICAL
Status: DISCONTINUED | OUTPATIENT
Start: 2024-09-03 | End: 2024-09-06 | Stop reason: HOSPADM

## 2024-09-03 RX ORDER — CALCIUM CARBONATE 500 MG/1
1000 TABLET, CHEWABLE ORAL 4 TIMES DAILY PRN
Status: DISCONTINUED | OUTPATIENT
Start: 2024-09-03 | End: 2024-09-06 | Stop reason: HOSPADM

## 2024-09-03 RX ORDER — FUROSEMIDE 20 MG
60 TABLET ORAL DAILY
Status: DISCONTINUED | OUTPATIENT
Start: 2024-09-03 | End: 2024-09-04

## 2024-09-03 RX ORDER — IPRATROPIUM BROMIDE AND ALBUTEROL SULFATE 2.5; .5 MG/3ML; MG/3ML
3 SOLUTION RESPIRATORY (INHALATION) EVERY 4 HOURS PRN
Status: DISCONTINUED | OUTPATIENT
Start: 2024-09-03 | End: 2024-09-06 | Stop reason: HOSPADM

## 2024-09-03 RX ORDER — ANTIOX #8/OM3/DHA/EPA/LUT/ZEAX 250-2.5 MG
CAPSULE ORAL 2 TIMES DAILY
Status: DISCONTINUED | OUTPATIENT
Start: 2024-09-03 | End: 2024-09-04

## 2024-09-03 RX ORDER — AMOXICILLIN 250 MG
2 CAPSULE ORAL 2 TIMES DAILY PRN
Status: DISCONTINUED | OUTPATIENT
Start: 2024-09-03 | End: 2024-09-06 | Stop reason: HOSPADM

## 2024-09-03 RX ORDER — ONDANSETRON 2 MG/ML
4 INJECTION INTRAMUSCULAR; INTRAVENOUS EVERY 6 HOURS PRN
Status: DISCONTINUED | OUTPATIENT
Start: 2024-09-03 | End: 2024-09-06 | Stop reason: HOSPADM

## 2024-09-03 RX ORDER — ACETAMINOPHEN 325 MG/1
650 TABLET ORAL EVERY 4 HOURS PRN
Status: DISCONTINUED | OUTPATIENT
Start: 2024-09-03 | End: 2024-09-06 | Stop reason: HOSPADM

## 2024-09-03 RX ORDER — CEFAZOLIN SODIUM IN 0.9 % NACL 3 G/100 ML
3 INTRAVENOUS SOLUTION, PIGGYBACK (ML) INTRAVENOUS ONCE
Status: COMPLETED | OUTPATIENT
Start: 2024-09-03 | End: 2024-09-04

## 2024-09-03 RX ORDER — BUPROPION HYDROCHLORIDE 150 MG/1
150 TABLET ORAL EVERY MORNING
Status: DISCONTINUED | OUTPATIENT
Start: 2024-09-04 | End: 2024-09-06 | Stop reason: HOSPADM

## 2024-09-03 RX ORDER — IPRATROPIUM BROMIDE AND ALBUTEROL SULFATE 2.5; .5 MG/3ML; MG/3ML
3 SOLUTION RESPIRATORY (INHALATION)
Status: DISCONTINUED | OUTPATIENT
Start: 2024-09-03 | End: 2024-09-03

## 2024-09-03 RX ORDER — ACETAMINOPHEN 325 MG/1
650 TABLET ORAL EVERY 4 HOURS PRN
Status: DISCONTINUED | OUTPATIENT
Start: 2024-09-03 | End: 2024-09-03

## 2024-09-03 RX ORDER — SERTRALINE HYDROCHLORIDE 100 MG/1
100 TABLET, FILM COATED ORAL EVERY MORNING
Status: DISCONTINUED | OUTPATIENT
Start: 2024-09-04 | End: 2024-09-06 | Stop reason: HOSPADM

## 2024-09-03 RX ORDER — FERROUS SULFATE 325(65) MG
325 TABLET ORAL
Status: DISCONTINUED | OUTPATIENT
Start: 2024-09-04 | End: 2024-09-06 | Stop reason: HOSPADM

## 2024-09-03 RX ORDER — AMOXICILLIN 250 MG
1 CAPSULE ORAL 2 TIMES DAILY PRN
Status: DISCONTINUED | OUTPATIENT
Start: 2024-09-03 | End: 2024-09-06 | Stop reason: HOSPADM

## 2024-09-03 RX ORDER — CARBOXYMETHYLCELLULOSE SODIUM 5 MG/ML
2 SOLUTION/ DROPS OPHTHALMIC
Status: DISCONTINUED | OUTPATIENT
Start: 2024-09-03 | End: 2024-09-06 | Stop reason: HOSPADM

## 2024-09-03 RX ADMIN — CEFAZOLIN 3 G: 10 INJECTION, POWDER, FOR SOLUTION INTRAVENOUS at 14:18

## 2024-09-03 RX ADMIN — SODIUM CHLORIDE 500 ML: 9 INJECTION, SOLUTION INTRAVENOUS at 13:52

## 2024-09-03 RX ADMIN — FUROSEMIDE 60 MG: 20 TABLET ORAL at 22:08

## 2024-09-03 ASSESSMENT — ACTIVITIES OF DAILY LIVING (ADL)
ADLS_ACUITY_SCORE: 26
ADLS_ACUITY_SCORE: 29
WALKING_OR_CLIMBING_STAIRS_DIFFICULTY: NO
ADLS_ACUITY_SCORE: 37
EQUIPMENT_CURRENTLY_USED_AT_HOME: WALKER, ROLLING
ADLS_ACUITY_SCORE: 37
ADLS_ACUITY_SCORE: 37
NUMBER_OF_TIMES_PATIENT_HAS_FALLEN_WITHIN_LAST_SIX_MONTHS: 1
FALL_HISTORY_WITHIN_LAST_SIX_MONTHS: YES
ADLS_ACUITY_SCORE: 29
CHANGE_IN_FUNCTIONAL_STATUS_SINCE_ONSET_OF_CURRENT_ILLNESS/INJURY: NO
ADLS_ACUITY_SCORE: 29
ADLS_ACUITY_SCORE: 29
ADLS_ACUITY_SCORE: 37
ADLS_ACUITY_SCORE: 29
ADLS_ACUITY_SCORE: 37

## 2024-09-03 ASSESSMENT — COLUMBIA-SUICIDE SEVERITY RATING SCALE - C-SSRS
6. HAVE YOU EVER DONE ANYTHING, STARTED TO DO ANYTHING, OR PREPARED TO DO ANYTHING TO END YOUR LIFE?: NO
2. HAVE YOU ACTUALLY HAD ANY THOUGHTS OF KILLING YOURSELF IN THE PAST MONTH?: NO
1. IN THE PAST MONTH, HAVE YOU WISHED YOU WERE DEAD OR WISHED YOU COULD GO TO SLEEP AND NOT WAKE UP?: NO

## 2024-09-03 NOTE — ED NOTES
[facility]  ED Nurse Handoff Report    ED Chief complaint: Leg Pain  . ED Diagnosis:   Final diagnoses:   Cellulitis of left lower leg   Leukocytosis, unspecified type   CASSI (acute kidney injury) (H24)       Allergies:   Allergies   Allergen Reactions    Lisinopril Cough       Code Status: DNR / DNI    Activity level - Baseline/Home:  standby.  Activity Level - Current:   assist of 1.   Lift room needed: No.   Bariatric: No   Needed: No   Isolation: No.   Infection: Not Applicable.     Respiratory status: Room air    Vital Signs (within 30 minutes):   Vitals:    09/03/24 1429 09/03/24 1443 09/03/24 1458 09/03/24 1513   BP: 138/73 137/62 135/63 126/64   Pulse: 85 81 82 82   Resp: 26 28 23 22   Temp:       TempSrc:       SpO2:    95%   Weight:           Cardiac Rhythm:  ,      Pain level:    Patient confused: No.   Patient Falls Risk: activity supervised.   Elimination Status:  Will let RN know when he needs to pee      Patient Report - Per provider note: Seth Mcdaniels is a 88 year old male who presents for evaluation of left leg redness.  Patient states that yesterday he noticed redness over his left lower extremity causing concern for possible infection.  He notes that he had a few episodes of vomiting yesterday as well.  The patient presented to urgent care today who noted him to be hypotensive with a blood pressure of 80/40 and therefore sent them to the ER due to concerns for sepsis/septic shock.  The patient states that he otherwise feels well.  He denies any fever, chest pain, shortness of breath, abdominal pain, diarrhea, numbness, or weakness.  The patient's daughter notes that his Lasix was recently increased from his previous hospital admission, but has not had issues with this as of yet.  They deny any other blood pressure medication changes.  The patient is anticoagulated on Xarelto and has not missed any doses.   .   Focused Assessment:      Abnormal Results:   Labs  Ordered and Resulted from Time of ED Arrival to Time of ED Departure   BASIC METABOLIC PANEL - Abnormal       Result Value    Sodium 136      Potassium 4.4      Chloride 101      Carbon Dioxide (CO2) 23      Anion Gap 12      Urea Nitrogen 22.6      Creatinine 1.30 (*)     GFR Estimate 53 (*)     Calcium 8.8      Glucose 112 (*)    CBC WITH PLATELETS AND DIFFERENTIAL - Abnormal    WBC Count 23.9 (*)     RBC Count 3.58 (*)     Hemoglobin 11.5 (*)     Hematocrit 35.5 (*)     MCV 99      MCH 32.1      MCHC 32.4      RDW 13.8      Platelet Count 144 (*)     NRBCs per 100 WBC 0      Absolute NRBCs 0.0     MANUAL DIFFERENTIAL - Abnormal    % Neutrophils 78      % Lymphocytes 4      % Monocytes 18      % Eosinophils 0      % Basophils 0      Absolute Neutrophils 18.6 (*)     Absolute Lymphocytes 1.0      Absolute Monocytes 4.3 (*)     Absolute Eosinophils 0.0      Absolute Basophils 0.0      RBC Morphology Confirmed RBC Indices      Platelet Assessment        Value: Automated Count Confirmed. Platelet morphology is normal.   COMPREHENSIVE METABOLIC PANEL - Abnormal    Sodium 133 (*)     Potassium 4.3      Carbon Dioxide (CO2) 21 (*)     Anion Gap 13      Urea Nitrogen 23.3 (*)     Creatinine 1.28 (*)     GFR Estimate 54 (*)     Calcium 8.5 (*)     Chloride 99      Glucose 100 (*)     Alkaline Phosphatase 71      AST 18      ALT 11      Protein Total 6.1 (*)     Albumin 3.4 (*)     Bilirubin Total 0.9     ISTAT GASES LACTATE VENOUS POCT - Abnormal    Lactic Acid POCT 1.5      Bicarbonate Venous POCT 27      O2 Sat, Venous POCT 60 (*)     pCO2 Venous POCT 44      pH Venous POCT 7.40      pO2 Venous POCT 32     LACTIC ACID WHOLE BLOOD WITH 1X REPEAT IN 2 HR WHEN >2 - Normal    Lactic Acid, Initial 1.7     BLOOD CULTURE   BLOOD CULTURE        No orders to display       Treatments provided: ABX, labs, fluids.   Family Comments: family at bedside.   OBS brochure/video discussed/provided to patient:  No  ED Medications:    Medications   sodium chloride (PF) 0.9% PF flush 3 mL (has no administration in time range)   sodium chloride (PF) 0.9% PF flush 3 mL (has no administration in time range)   acetaminophen (TYLENOL) tablet 650 mg (has no administration in time range)   aspirin EC tablet 81 mg (has no administration in time range)   atorvastatin (LIPITOR) tablet 40 mg (has no administration in time range)   buPROPion (WELLBUTRIN XL) 24 hr tablet 150 mg (has no administration in time range)   carboxymethylcellulose (REFRESH PLUS) 0.5 % ophthalmic solution 2 drop (has no administration in time range)   diclofenac (VOLTAREN) 1 % topical gel 4 g (has no administration in time range)   ferrous sulfate (FEROSUL) tablet 325 mg (has no administration in time range)   furosemide (LASIX) tablet 60 mg (has no administration in time range)   ipratropium - albuterol 0.5 mg/2.5 mg/3 mL (DUONEB) neb solution 3 mL (has no administration in time range)   PreserVision AREDS 2 CAPS (has no administration in time range)   nitroGLYcerin (NITROSTAT) sublingual tablet 0.4 mg (has no administration in time range)   rivaroxaban ANTICOAGULANT (XARELTO) tablet 20 mg (has no administration in time range)   sertraline (ZOLOFT) tablet 100 mg (has no administration in time range)   lidocaine 1 % 0.1-1 mL (has no administration in time range)   lidocaine (LMX4) cream (has no administration in time range)   sodium chloride (PF) 0.9% PF flush 3 mL (has no administration in time range)   sodium chloride (PF) 0.9% PF flush 3 mL (has no administration in time range)   senna-docusate (SENOKOT-S/PERICOLACE) 8.6-50 MG per tablet 1 tablet (has no administration in time range)     Or   senna-docusate (SENOKOT-S/PERICOLACE) 8.6-50 MG per tablet 2 tablet (has no administration in time range)   calcium carbonate (TUMS) chewable tablet 1,000 mg (has no administration in time range)   acetaminophen (TYLENOL) tablet 650 mg (has no administration in time range)     Or    acetaminophen (TYLENOL) Suppository 650 mg (has no administration in time range)   ondansetron (ZOFRAN ODT) ODT tab 4 mg (has no administration in time range)     Or   ondansetron (ZOFRAN) injection 4 mg (has no administration in time range)   Patient is already receiving anticoagulation with heparin, enoxaparin (LOVENOX), warfarin (COUMADIN)  or other anticoagulant medication (has no administration in time range)   sodium chloride 0.9% BOLUS 500 mL (500 mLs Intravenous $New Bag 9/3/24 1352)   ceFAZolin (ANCEF) 3 g in  mL intermittent infusion (3 g Intravenous $New Bag 9/3/24 1418)       Drips infusing:  No  For the majority of the shift this patient was Green.   Interventions performed were .    Sepsis treatment initiated: No    Cares/treatment/interventions/medications to be completed following ED care:     ED Nurse Name: Yordan Granda RN  4:00 PM     RECEIVING UNIT ED HANDOFF REVIEW    Above ED Nurse Handoff Report was reviewed: No  Reviewed by: Elva Rico RN on September 3, 2024 at 6:14 PM   DARIAN Doll called the ED to inform them the note was read: No

## 2024-09-03 NOTE — H&P
[facility]    History and Physical - Hospitalist Service       Date of Admission:  9/3/2024    Assessment & Plan      Seth Mcdaniels is a 88 year old male with complicated medical history significant for chronic anticoagulated state with DOAC with longstanding history of chronic atrial fibrillation, recent hospitalization and TCU discharge after a mechanical fall with complicated rectus sheath hematoma, recent hypoxic respiratory failure with underlying COVID-19 infection and hospital-acquired pneumonia, KALEIGH, morbid obesity, benign essential hypertension, prior pacemaker insertion, chronic congestive heart failure with preserved EF, history of CVA, depression anxiety and stable anemia, suspected CKD with previous elevated creatinine who was sent here from his clinic providers office due to concerns for left leg redness and was noted of having soft blood pressure levels with concerns for underlying sepsis.  He mentioned that he was in his usual state of health until 1 day prior to this presentation when he noticed this increasing left leg redness and accompanied with weeping edema.    Problem list:    #SIRS versus early sepsis with subjective chills, tachypnea, leukocytosis, hypotension noted in the outpatient clinic secondary to underlying lower extremity cellulitis  #Chronic bilateral lower extremity edema    -Patient remained high risk for clinical deterioration will be benefiting for close monitoring care under inpatient service  -Continuation of IV antibiotics as started with Ancef.  Close monitoring infectious markers.  Cultures will be followed as sent earlier.  -Patient is currently demonstrating stable hemodynamics.  Will hold off further aggressive fluid resuscitation in the setting of earlier wheezing, complicated medical history of longstanding congestive heart failure with preserved ejection fraction and at risk for hypovolemia status.  -As needed APAP for discomfort and  pain  -Will continue home regimen of diuretics if blood pressure remains stable    #History of chronic atrial fibrillation on chronic anticoagulation  -Resume home regimen of DOAC  -Resume home regimen of metoprolol    #History of KALEIGH  #History of morbid obesity    -Complicates cares, can resume home appliance    #History of CVA  -Resume regimen of aspirin and statin    #History of anxiety  #History of depression  -Resume home regimen          Diet:  Regular  DVT Prophylaxis: Continued on DOAC  Benedict Catheter: Not present  Lines: None     Cardiac Monitoring: None  Code Status:  DNR/DNI consistent with recent POLST as documented in epic that was updated on patient's recent TCU stay    Clinically Significant Risk Factors Present on Admission          # Hypocalcemia: Lowest Ca = 8.5 mg/dL in last 2 days, will monitor and replace as appropriate     # Hypoalbuminemia: Lowest albumin = 3.4 g/dL at 9/3/2024 12:17 PM, will monitor as appropriate  # Drug Induced Coagulation Defect: home medication list includes an anticoagulant medication  # Drug Induced Platelet Defect: home medication list includes an antiplatelet medication                            Disposition Plan     Medically Ready for Discharge: Anticipated in 2-4 Days           Deshaun Tracey MD, MD  Hospitalist Service  [facility]  Securely message with [employer] (more info)  Text page via [employer] Paging/Directory     ______________________________________________________________________    Chief Complaint   Left leg redness  Sent from community due to concerns for low blood pressure    History is obtained from the patient  Review of electronic medical records  Discussion with emergency room provider    History of Present Illness   Seth Mcdaniels is a 88 year old male with complicated medical history significant for chronic anticoagulated state with DOAC with longstanding history of chronic atrial fibrillation, recent hospitalization and  TCU discharge after a mechanical fall with complicated rectus sheath hematoma, recent hypoxic respiratory failure with underlying COVID-19 infection and hospital-acquired pneumonia, KALEIGH, morbid obesity, benign essential hypertension, prior pacemaker insertion, chronic congestive heart failure with preserved EF, history of CVA, depression anxiety and stable anemia, suspected CKD with previous elevated creatinine who was sent here from his clinic providers office due to concerns for left leg redness and was noted of having soft blood pressure levels with concerns for underlying sepsis.  He mentioned that he was in his usual state of health until 1 day prior to this presentation when he noticed this increasing left leg redness and accompanied with weeping edema.  He also mention about feeling weak, sensation of chills at home.  Fortunately no episodes of reported fall event, mental status changes such as agitation or being combative.  Denies any chest pain or any worsening shortness of breath.  No focal weakness or seizure-like activity noted.   Upon presentation in the emergency room he demonstrated stable hemodynamics with no documented hypotension, no tachycardia, afebrile and no significant hypoxia.  Subsequent lab testing did reveal significant leukocytosis, mildly elevated creatinine levels and with concerns for underlying infectious process lactic acid was drawn which revealed normal levels.  Venous blood gas was reassuring.  Low suspicion for underlying DVT due to to chronic anticoagulated state.  He was started on IV antibiotics and concerns for early sepsis, advanced age and complicated background medical history his case was referred to our service for further evaluation and care hence this hospitalization.          Past Medical History    Past Medical History:   Diagnosis Date    Depressive disorder, not elsewhere classified     History of tension headache     Hypertension     Obesity, unspecified      Obstructive sleep apnea (adult) (pediatric)     Osteoarthritis     Unspecified cerebral artery occlusion with cerebral infarction     no residual       Past Surgical History   Past Surgical History:   Procedure Laterality Date    ARTHROPLASTY KNEE Right 7/17/2015    Procedure: ARTHROPLASTY KNEE;  Surgeon: Jarod Jaime MD;  Location: [facility] OR    COLONOSCOPY      2003    PHACOEMULSIFICATION CLEAR CORNEA WITH STANDARD INTRAOCULAR LENS IMPLANT  11/29/2012    Procedure: PHACOEMULSIFICATION CLEAR CORNEA WITH STANDARD INTRAOCULAR LENS IMPLANT;  RIGHT EYE PHACOEMULSIFICATION CLEAR CORNEA WITH STANDARD INTRAOCULAR LENS IMPLANT ;  Surgeon: Cody Salazar MD;  Location: [facility]    PHACOEMULSIFICATION CLEAR CORNEA WITH TORIC INTRAOCULAR LENS IMPLANT  9/27/2012    Procedure: PHACOEMULSIFICATION CLEAR CORNEA WITH TORIC INTRAOCULAR LENS IMPLANT;  LEFT PHACOEMULSIFICAITON CLEAR CORNEA WITH  CHARLIE TORIC  INTRAOCULAR LENS IMPLANT ;  Surgeon: Cody Salazar MD;  Location: [facility]       Prior to Admission Medications   Prior to Admission Medications   Prescriptions Last Dose Informant Patient Reported? Taking?   Flaxseed, Linseed, (FLAXSEED OIL) 1000 MG CAPS   Yes No   Sig: Take 1,000 mg by mouth daily   Multiple Vitamins-Minerals (PRESERVISION AREDS 2 PO)   Yes No   Sig: Take 1 tablet by mouth 2 times daily   SERTRALINE HCL PO   Yes No   Sig: Take 100 mg by mouth every morning    acetaminophen (TYLENOL) 325 MG tablet   Yes No   Sig: Take 650 mg by mouth every 4 hours as needed for mild pain   aspirin (ASA) 81 MG EC tablet   No No   Sig: Take 1 tablet (81 mg) by mouth daily   atorvastatin (LIPITOR) 40 MG tablet   Yes No   Sig: Take 40 mg by mouth every evening   buPROPion (WELLBUTRIN XL) 150 MG 24 hr tablet   Yes No   Sig: Take 150 mg by mouth every morning for anxiousness associated with  depression.   carboxymethylcellulose (REFRESH PLUS) 0.5 % SOLN ophthalmic solution   Yes No   Sig: Place 2 drops into both eyes  every 2 hours as needed for dry eyes   clobetasol (TEMOVATE) 0.05 % external ointment   Yes No   Sig: Apply topically 2 times daily  Apply  to BLE topically two times a day for Rash and  nonspecific skin eruption. Tinea, pedis for 2 Weeks  Lower legs until follow up in 2 weeks.   diclofenac (VOLTAREN) 1 % topical gel   No No   Sig: Apply 4 g topically 3 times daily Bilateral knees   ferrous sulfate (FEROSUL) 325 (65 Fe) MG tablet   Yes No   Sig: Take 325 mg by mouth daily (with breakfast)   furosemide (LASIX) 20 MG tablet   No No   Sig: Take 3 tablets (60 mg) by mouth daily   ipratropium - albuterol 0.5 mg/2.5 mg/3 mL (DUONEB) 0.5-2.5 (3) MG/3ML neb solution   Yes No   Sig: Take 3 mLs by nebulization every 4 hours as needed for wheezing or shortness of breath   ketoconazole (NIZORAL) 2 % external cream   Yes No   Sig: Apply topically 2 times daily Apply to Left upper  arm topically two times a day for skin infection due to  the fungus candida also apply to interdigital spaces   metoprolol succinate ER (TOPROL XL) 25 MG 24 hr tablet   Yes No   Sig: Take 25 mg by mouth every morning htn   mupirocin (BACTROBAN) 2 % external ointment   Yes No   Sig: Apply topically 3 times daily Apply to right lower  extremity topically three times a day for cellulitis   nitroGLYcerin (NITROSTAT) 0.4 MG sublingual tablet   Yes No   Sig: Place 0.4 mg under the tongue every 5 minutes as needed for chest pain   nystatin (MYCOSTATIN) 468362 UNIT/GM external powder   Yes No   Sig: Apply topically 2 times daily Apply to Affected areas topically two  times a day for skin rash   polyethylene glycol (MIRALAX) 17 g packet   Yes No   Sig: Take 17 g by mouth every morning constipation   rivaroxaban ANTICOAGULANT (XARELTO) 20 MG TABS tablet   No No   Sig: Take 1 tablet (20 mg) by mouth daily (with dinner)   senna-docusate (SENOKOT-S/PERICOLACE) 8.6-50 MG tablet   No No   Sig: Take 1 tablet by mouth 2 times daily as needed for constipation    triamcinolone (KENALOG) 0.1 % external cream   Yes No   Sig: Apply  to lower legs topically two times a day every Mon,  Wed, Fri for inflammation of the skin due to an allergy      Facility-Administered Medications: None        Review of Systems    The 10 point Review of Systems is negative other than noted in the HPI or here.     Social History   I have reviewed this patient's social history and updated it with pertinent information if needed.  Social History     Tobacco Use    Smoking status: Never    Smokeless tobacco: Current     Types: Chew   Substance Use Topics    Alcohol use: Yes     Comment: rare    Drug use: No         Family History     No significant family history      Allergies   Allergies   Allergen Reactions    Lisinopril Cough        Physical Exam   Vital Signs: Temp: 98  F (36.7  C) Temp src: Oral BP: 138/73 Pulse: 85   Resp: 26 SpO2: 94 % O2 Device: None (Room air)    Weight: 351 lbs 6.61 oz    Morbid obese  HEENT; Atraumatic, normocephalic, pinkish conjuctiva, pupils bilateral reactive   Skin: warm and moist, no rashes  Lungs: equal chest expansion, clear to auscultation, no wheezes, no stridor, no crackles,   Heart: normal rate, normal rhythm, no rubs or gallops.   Abdomen: normal bowel sounds, no tenderness, no peritoneal signs, no guarding  Extremities: no deformities, bilateral weeping edema, left lower extremity more erythematous than right  Neuro; follow commands, alert and oriented x3, spontaneous speech, coherent, moves all extremities spontaneously  Psych; no hallucination, euthymic mood, not agitated      Medical Decision Making       50 MINUTES SPENT BY ME on the date of service doing chart review, history, exam, documentation & further activities per the note.  MANAGEMENT DISCUSSED with the following over the past 24 hours: Yes   NOTE(S)/MEDICAL RECORDS REVIEWED over the past 24 hours: Yes       Data     I have personally reviewed the following data over the past 24 hrs:    23.9 (H)   \   11.5 (L)   / 144 (L)     136; 133 (L) 101; 99 22.6; 23.3 (H) /  112 (H); 100 (H)   4.4; 4.3 23; 21 (L) 1.30 (H); 1.28 (H) \     ALT: 11 AST: 18 AP: 71 TBILI: 0.9   ALB: 3.4 (L) TOT PROTEIN: 6.1 (L) LIPASE: N/A     Procal: N/A CRP: N/A Lactic Acid: 1.7         Imaging results reviewed over the past 24 hrs:   No results found for this or any previous visit (from the past 24 hour(s)).

## 2024-09-03 NOTE — ED PROVIDER NOTES
"ED APC SUPERVISION NOTE:   I evaluated this patient in conjunction with Tammy Luo PA-C  I have participated in the care of the patient and personally performed key elements of the history, exam, and medical decision making.      HPI:   Seth Mcdaniels is a 88 year old male who presents with concerns over left leg erythema and warmth.  He notes that yesterday.  He has had some vomiting and was hypotensive with a blood pressure of 80/40 at urgent care.  He denies fever or any other associated symptoms.  He is on Xarelto and has not missed any doses.    Independent Historian:   Family as detailed above.    Review of External Notes: I reviewed his urgent care visit from earlier today.     EXAM:   /55 (BP Location: Left arm)   Pulse 84   Temp 98.6  F (37  C) (Oral)   Resp 22   Ht 1.778 m (5' 10\")   Wt (!) 159.2 kg (351 lb)   SpO2 93%   BMI 50.36 kg/m    Constitutional: Vital signs reviewed as above  General: Alert  HEENT: Moist mucous membranes  Eyes: Conjunctiva normal.   Neck: Normal range of motion  Cardiovascular: Regular rate, Regular rhythm and normal heart sounds.  No MRG  Pulmonary/Chest: Tachypneic. patient has no wheezes. Patient has no rales.   Abdominal: Soft. Positive bowel sounds. No MRG.  Musculoskeletal/Extremities: Full ROM.  Edema of the bilateral lower extremities left slightly greater than right.  There is erythema warmth and swelling over the anterior shin on the left with open weeping areas of serosanguineous fluid.  No bony tenderness or deformities.  Endo: No pitting edema  Neurological: Alert, no focal deficits.  Skin: As above  Psychiatric: Pleasant      Independent Interpretation (X-rays, CTs, rhythm strip):  EKG from today shows atrially paced rhythm with a rate of 81.    Consultations/Discussion of Management or Tests:  Dr. Tracey, the admitting hospitalist    Social Determinants of Health affecting care:   None     MEDICAL DECISION MAKING/ASSESSMENT AND PLAN: "   Seth is an 88-year-old gentleman who presents with erythema warmth tenderness and serosanguineous drainage from what appears to be a left leg cellulitis.  He was reportedly hypotensive on arrival to the urgent care earlier.  He has a marked leukocytosis at 23.9.  He has several minor electrolyte abnormalities and a chronic renal insufficiency with a creatinine 1.28 which is near baseline.  Lactate was normal.  Blood culture in process.  He was given 3 g of Ancef.  He will be admitted to the hospitalist service     DIAGNOSIS:     ICD-10-CM    1. Cellulitis of left lower leg  L03.116       2. Leukocytosis, unspecified type  D72.829       3. CASSI (acute kidney injury) (H24)  N17.9       4. Sepsis without acute organ dysfunction, due to unspecified organism (H)  A41.9                DISPOSITION:   Patient was admitted to the hospital service     Lefty Camarena MD  9/3/2024  [facility] EMERGENCY DEPT     Lefty Camarena MD  09/03/24 2396

## 2024-09-03 NOTE — ED TRIAGE NOTES
Pt with reddened area to left lower leg and pain which he stated he first noticed last night. Denies fevers, went to clinic today and had a BP in 80's. 100's per EMS. ABC's intact, alert and oriented X3.      Triage Assessment (Adult)       Row Name 09/03/24 1213          Triage Assessment    Airway WDL WDL        Respiratory WDL    Respiratory WDL WDL        Skin Circulation/Temperature WDL    Skin Circulation/Temperature WDL X  reddened area to left lower leg.        Cardiac WDL    Cardiac WDL WDL        Peripheral/Neurovascular WDL    Peripheral Neurovascular WDL WDL        Cognitive/Neuro/Behavioral WDL    Cognitive/Neuro/Behavioral WDL WDL

## 2024-09-03 NOTE — PLAN OF CARE
"Goal Outcome Evaluation:      Plan of Care Reviewed With: patient    Overall Patient Progress: no changeOverall Patient Progress: no change    Outcome Evaluation: PT A/O x4, pt [employer] order placed for auxillary aids, PT VSS upon arrival to floor. PT has fine crackles in bases of lungs with infrequent productive cough. PT is A1 w/walker. Fall precautions in place, Bed alarm on. PT is noted to have increased LLE with open blisters and brusing from cellulitis. 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 Food order read and delgeated order to aid to place order. PT is noting SOB with ambulation.      Problem: Adult Inpatient Plan of Care  Goal: Plan of Care Review  Description: The Plan of Care Review/Shift note should be completed every shift.  The Outcome Evaluation is a brief statement about your assessment that the patient is improving, declining, or no change.  This information will be displayed automatically on your shift  note.  Outcome: Progressing  Flowsheets (Taken 9/3/2024 1809)  Outcome Evaluation: PT A/O x4, pt [employer] order placed for auxillary aids, PT VSS upon arrival to floor. PT has fine crackles in bases of lungs with infrequent productive cough. PT is A1 w/walker. Fall precautions in place, Bed alarm on. PT is noted to have increased LLE with open blisters and brusing from cellulitis. 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 Food order read and delgeated order to aid to place order. PT is noting SOB with ambulation.  Plan of Care Reviewed With: patient  Overall Patient Progress: no change  Goal: Patient-Specific Goal (Individualized)  Description: You can add care plan individualizations to a care plan. Examples of Individualization might be:  \"Parent requests to be called daily at 9am for status\", \"I have a hard time hearing out of my right ear\", or \"Do not touch me to wake me up as it startles  me\".  Outcome: Progressing  Goal: Absence of Hospital-Acquired Illness " or Injury  Outcome: Progressing  Goal: Optimal Comfort and Wellbeing  Outcome: Progressing  Goal: Readiness for Transition of Care  Outcome: Progressing  Intervention: Mutually Develop Transition Plan  Recent Flowsheet Documentation  Taken 9/3/2024 1700 by Elva Rico RN  Equipment Currently Used at Home: walker, rolling     Problem: Fall Injury Risk  Goal: Absence of Fall and Fall-Related Injury  Outcome: Progressing     Problem: Pain Acute  Goal: Optimal Pain Control and Function  Outcome: Progressing

## 2024-09-03 NOTE — PROGRESS NOTES
"[facility]  Respiratory Care Note    [facility] RCAT     Date: 09/03/2024  Admission Dx: Sepsis, leg swelling  Pulmonary History: None  Home Nebulizer/MDI Use: Duoneb Q4 prn  Home Oxygen: None  Acuity Level (RCAT flow sheet): 4  Aerosol Therapy initiated: Duoneb Q4 prn, Albuterol Q2 prn   Pulmonary Hygiene initiated: Cough and deep breathing techniques TID  Volume Expansion initiated: IS TID  Current Oxygen Requirements: Room Air  Current SpO2: 93%  Re-evaluation date: 09/06/2024  Patient Education: Education was performed with the patient in regards to indications/benefits and possible side effects of bronchodilators. Will continue to do education with patient.       See \"RT Assessments\" flow sheet for patient assessment scoring and Acuity Level Details.       Vital signs:  Temp: 98.6  F (37  C) Temp src: Oral BP: 106/55 Pulse: 84   Resp: 22 SpO2: 93 % O2 Device: None (Room air)   Height: 177.8 cm (5' 10\") Weight: (!) 159.2 kg (351 lb)  Estimated body mass index is 50.36 kg/m  as calculated from the following:    Height as of this encounter: 1.778 m (5' 10\").    Weight as of this encounter: 159.2 kg (351 lb).      Past Medical History:   Diagnosis Date    Depressive disorder, not elsewhere classified     History of tension headache     Hypertension     Obesity, unspecified     Obstructive sleep apnea (adult) (pediatric)     Osteoarthritis     Unspecified cerebral artery occlusion with cerebral infarction     no residual       Past Surgical History:   Procedure Laterality Date    ARTHROPLASTY KNEE Right 7/17/2015    Procedure: ARTHROPLASTY KNEE;  Surgeon: Jarod Jaime MD;  Location:  OR    COLONOSCOPY      2003    PHACOEMULSIFICATION CLEAR CORNEA WITH STANDARD INTRAOCULAR LENS IMPLANT  11/29/2012    Procedure: PHACOEMULSIFICATION CLEAR CORNEA WITH STANDARD INTRAOCULAR LENS IMPLANT;  RIGHT EYE PHACOEMULSIFICATION CLEAR CORNEA WITH STANDARD INTRAOCULAR LENS IMPLANT ;  Surgeon: " Cody Salazar MD;  Location: [facility]    PHACOEMULSIFICATION CLEAR CORNEA WITH TORIC INTRAOCULAR LENS IMPLANT  9/27/2012    Procedure: PHACOEMULSIFICATION CLEAR CORNEA WITH TORIC INTRAOCULAR LENS IMPLANT;  LEFT PHACOEMULSIFICAITON CLEAR CORNEA WITH  CHARLIE TORIC  INTRAOCULAR LENS IMPLANT ;  Surgeon: Cody Salazar MD;  Location: [facility]       No family history on file.    Social History     Tobacco Use    Smoking status: Never    Smokeless tobacco: Current     Types: Chew   Substance Use Topics    Alcohol use: Yes     Comment: rare     Prior to Admission Medications[]Expand by Default  Prior to Admission Medications   Prescriptions Last Dose Informant Patient Reported? Taking?   Flaxseed, Linseed, (FLAXSEED OIL) 1000 MG CAPS     Yes No   Sig: Take 1,000 mg by mouth daily   Multiple Vitamins-Minerals (PRESERVISION AREDS 2 PO)     Yes No   Sig: Take 1 tablet by mouth 2 times daily   SERTRALINE HCL PO     Yes No   Sig: Take 100 mg by mouth every morning    acetaminophen (TYLENOL) 325 MG tablet     Yes No   Sig: Take 650 mg by mouth every 4 hours as needed for mild pain   aspirin (ASA) 81 MG EC tablet     No No   Sig: Take 1 tablet (81 mg) by mouth daily   atorvastatin (LIPITOR) 40 MG tablet     Yes No   Sig: Take 40 mg by mouth every evening   buPROPion (WELLBUTRIN XL) 150 MG 24 hr tablet     Yes No   Sig: Take 150 mg by mouth every morning for anxiousness associated with  depression.   carboxymethylcellulose (REFRESH PLUS) 0.5 % SOLN ophthalmic solution     Yes No   Sig: Place 2 drops into both eyes every 2 hours as needed for dry eyes   clobetasol (TEMOVATE) 0.05 % external ointment     Yes No   Sig: Apply topically 2 times daily  Apply  to BLE topically two times a day for Rash and  nonspecific skin eruption. Tinea, pedis for 2 Weeks  Lower legs until follow up in 2 weeks.   diclofenac (VOLTAREN) 1 % topical gel     No No   Sig: Apply 4 g topically 3 times daily Bilateral knees   ferrous sulfate (FEROSUL)  325 (65 Fe) MG tablet     Yes No   Sig: Take 325 mg by mouth daily (with breakfast)   furosemide (LASIX) 20 MG tablet     No No   Sig: Take 3 tablets (60 mg) by mouth daily   ipratropium - albuterol 0.5 mg/2.5 mg/3 mL (DUONEB) 0.5-2.5 (3) MG/3ML neb solution     Yes No   Sig: Take 3 mLs by nebulization every 4 hours as needed for wheezing or shortness of breath   ketoconazole (NIZORAL) 2 % external cream     Yes No   Sig: Apply topically 2 times daily Apply to Left upper  arm topically two times a day for skin infection due to  the fungus candida also apply to interdigital spaces   metoprolol succinate ER (TOPROL XL) 25 MG 24 hr tablet     Yes No   Sig: Take 25 mg by mouth every morning htn   mupirocin (BACTROBAN) 2 % external ointment     Yes No   Sig: Apply topically 3 times daily Apply to right lower  extremity topically three times a day for cellulitis   nitroGLYcerin (NITROSTAT) 0.4 MG sublingual tablet     Yes No   Sig: Place 0.4 mg under the tongue every 5 minutes as needed for chest pain   nystatin (MYCOSTATIN) 854791 UNIT/GM external powder     Yes No   Sig: Apply topically 2 times daily Apply to Affected areas topically two  times a day for skin rash   polyethylene glycol (MIRALAX) 17 g packet     Yes No   Sig: Take 17 g by mouth every morning constipation   rivaroxaban ANTICOAGULANT (XARELTO) 20 MG TABS tablet     No No   Sig: Take 1 tablet (20 mg) by mouth daily (with dinner)   senna-docusate (SENOKOT-S/PERICOLACE) 8.6-50 MG tablet     No No   Sig: Take 1 tablet by mouth 2 times daily as needed for constipation   triamcinolone (KENALOG) 0.1 % external cream     Yes No   Sig: Apply  to lower legs topically two times a day every Mon,  Wed, Fri for inflammation of the skin due to an allergy      Facility-Administered Medications: None      Trevor Singh [facility]  9/3/2024

## 2024-09-03 NOTE — ED PROVIDER NOTES
Emergency Department Note      History of Present Illness     Chief Complaint   Leg Pain      HPI   Seth Mcdaniels is a 88 year old male who presents for evaluation of left leg redness.  Patient states that yesterday he noticed redness over his left lower extremity causing concern for possible infection.  He notes that he had a few episodes of vomiting yesterday as well.  The patient presented to urgent care today who noted him to be hypotensive with a blood pressure of 80/40 and therefore sent them to the ER due to concerns for sepsis/septic shock.  The patient states that he otherwise feels well.  He denies any fever, chest pain, shortness of breath, abdominal pain, diarrhea, numbness, or weakness.  The patient's daughter notes that his Lasix was recently increased from his previous hospital admission, but has not had issues with this as of yet.  They deny any other blood pressure medication changes.  The patient is anticoagulated on Xarelto and has not missed any doses.    Independent Historian   The patient and his family provide the history.    Review of External Notes   9/3/24: Patient evaluated at  for leg swelling/redness, hypotensive at 87/52, concern for sepsis, sent to ED    Past Medical History     Medical History and Problem List   Past Medical History:   Diagnosis Date    Depressive disorder, not elsewhere classified     History of tension headache     Hypertension     Obesity, unspecified     Obstructive sleep apnea (adult) (pediatric)     Osteoarthritis     Unspecified cerebral artery occlusion with cerebral infarction        Medications   acetaminophen (TYLENOL) 325 MG tablet  aspirin (ASA) 81 MG EC tablet  atorvastatin (LIPITOR) 40 MG tablet  buPROPion (WELLBUTRIN XL) 150 MG 24 hr tablet  carboxymethylcellulose (REFRESH PLUS) 0.5 % SOLN ophthalmic solution  clobetasol (TEMOVATE) 0.05 % external ointment  diclofenac (VOLTAREN) 1 % topical gel  ferrous sulfate (FEROSUL) 325 (65 Fe) MG  tablet  Flaxseed, Linseed, (FLAXSEED OIL) 1000 MG CAPS  furosemide (LASIX) 20 MG tablet  ipratropium - albuterol 0.5 mg/2.5 mg/3 mL (DUONEB) 0.5-2.5 (3) MG/3ML neb solution  ketoconazole (NIZORAL) 2 % external cream  metoprolol succinate ER (TOPROL XL) 25 MG 24 hr tablet  Multiple Vitamins-Minerals (PRESERVISION AREDS 2 PO)  mupirocin (BACTROBAN) 2 % external ointment  nitroGLYcerin (NITROSTAT) 0.4 MG sublingual tablet  nystatin (MYCOSTATIN) 258139 UNIT/GM external powder  polyethylene glycol (MIRALAX) 17 g packet  rivaroxaban ANTICOAGULANT (XARELTO) 20 MG TABS tablet  senna-docusate (SENOKOT-S/PERICOLACE) 8.6-50 MG tablet  SERTRALINE HCL PO  triamcinolone (KENALOG) 0.1 % external cream        Surgical History   Past Surgical History:   Procedure Laterality Date    ARTHROPLASTY KNEE Right 7/17/2015    Procedure: ARTHROPLASTY KNEE;  Surgeon: Jarod Jaime MD;  Location:  OR    COLONOSCOPY      2003    PHACOEMULSIFICATION CLEAR CORNEA WITH STANDARD INTRAOCULAR LENS IMPLANT  11/29/2012    Procedure: PHACOEMULSIFICATION CLEAR CORNEA WITH STANDARD INTRAOCULAR LENS IMPLANT;  RIGHT EYE PHACOEMULSIFICATION CLEAR CORNEA WITH STANDARD INTRAOCULAR LENS IMPLANT ;  Surgeon: Cody Salazar MD;  Location: [facility]    PHACOEMULSIFICATION CLEAR CORNEA WITH TORIC INTRAOCULAR LENS IMPLANT  9/27/2012    Procedure: PHACOEMULSIFICATION CLEAR CORNEA WITH TORIC INTRAOCULAR LENS IMPLANT;  LEFT PHACOEMULSIFICAITON CLEAR CORNEA WITH  CHARLIE TORIC  INTRAOCULAR LENS IMPLANT ;  Surgeon: Cody Salazar MD;  Location: [facility]       Physical Exam     Patient Vitals for the past 24 hrs:   BP Temp Temp src Pulse Resp SpO2 Weight   09/03/24 1429 138/73 -- -- 85 26 -- --   09/03/24 1414 135/75 -- -- 84 28 -- --   09/03/24 1214 104/57 98  F (36.7  C) Oral 84 18 94 % (!) 159.4 kg (351 lb 6.6 oz)     Physical Exam  General: Alert, well developed, well nourished. Cooperative.     In moderate  distress  HEENT:  Head:  Atraumatic  Ears:  External ears are normal  Eyes:   Conjunctivae normal and EOM are normal. No scleral icterus.    Pupils are equal, round, and reactive to light.   Neck:   Normal range of motion. Neck supple.  CV:  Normal rate, regular rhythm, normal heart sounds and radial pulses are 2+ and symmetric.  No murmur.  Resp:  Audible wheezing    Non-labored, no retractions or accessory muscle use  MS:  Normal range of motion.     Edema of bilateral lower extremities, left greater than right.  Erythema, warmth, and swelling over the anterior left shin, nearly circumferential with weeping.  DP pulse palpable.  Peripheral sensation intact to light touch distally.  Full range of motion of ankle and knee.    Back atraumatic.  Skin:  Warm and dry.  No rash or lesions noted.  Neuro:   Alert. Normal strength.   Psych: Normal mood and affect.    Diagnostics     Lab Results   Labs Ordered and Resulted from Time of ED Arrival to Time of ED Departure   BASIC METABOLIC PANEL - Abnormal       Result Value    Sodium 136      Potassium 4.4      Chloride 101      Carbon Dioxide (CO2) 23      Anion Gap 12      Urea Nitrogen 22.6      Creatinine 1.30 (*)     GFR Estimate 53 (*)     Calcium 8.8      Glucose 112 (*)    CBC WITH PLATELETS AND DIFFERENTIAL - Abnormal    WBC Count 23.9 (*)     RBC Count 3.58 (*)     Hemoglobin 11.5 (*)     Hematocrit 35.5 (*)     MCV 99      MCH 32.1      MCHC 32.4      RDW 13.8      Platelet Count 144 (*)     NRBCs per 100 WBC 0      Absolute NRBCs 0.0     MANUAL DIFFERENTIAL - Abnormal    % Neutrophils 78      % Lymphocytes 4      % Monocytes 18      % Eosinophils 0      % Basophils 0      Absolute Neutrophils 18.6 (*)     Absolute Lymphocytes 1.0      Absolute Monocytes 4.3 (*)     Absolute Eosinophils 0.0      Absolute Basophils 0.0      RBC Morphology Confirmed RBC Indices      Platelet Assessment        Value: Automated Count Confirmed. Platelet morphology is normal.    COMPREHENSIVE METABOLIC PANEL - Abnormal    Sodium 133 (*)     Potassium 4.3      Carbon Dioxide (CO2) 21 (*)     Anion Gap 13      Urea Nitrogen 23.3 (*)     Creatinine 1.28 (*)     GFR Estimate 54 (*)     Calcium 8.5 (*)     Chloride 99      Glucose 100 (*)     Alkaline Phosphatase 71      AST 18      ALT 11      Protein Total 6.1 (*)     Albumin 3.4 (*)     Bilirubin Total 0.9     ISTAT GASES LACTATE VENOUS POCT - Abnormal    Lactic Acid POCT 1.5      Bicarbonate Venous POCT 27      O2 Sat, Venous POCT 60 (*)     pCO2 Venous POCT 44      pH Venous POCT 7.40      pO2 Venous POCT 32     LACTIC ACID WHOLE BLOOD WITH 1X REPEAT IN 2 HR WHEN >2 - Normal    Lactic Acid, Initial 1.7     BLOOD CULTURE   BLOOD CULTURE     EKG   ECG results from 09/03/24   EKG 12-lead, tracing only     Value    Systolic Blood Pressure     Diastolic Blood Pressure     Ventricular Rate 81    Atrial Rate 81    WA Interval 218    QRS Duration 106        QTc 455    P Axis 70    R AXIS 7    T Axis 184    Interpretation ECG      Atrial-sensed ventricular-paced rhythm with prolonged AV conduction with occasional Premature ventricular complexes  Abnormal ECG  When compared with ECG of 01-Mar-2021 11:59,  Electronic ventricular pacemaker has replaced Sinus rhythm  Unconfirmed report - interpretation of this ECG is computer generated - see medical record for final interpretation  Confirmed by - EMERGENCY ROOM, PHYSICIAN ([id number]),  Cole Brannon ([id number]) on 9/3/2024 1:26:32 PM  Interpreted by Dr. Camarena     Independent Interpretation   None    ED Course      Medications Administered   Medications   sodium chloride 0.9% BOLUS 500 mL (500 mLs Intravenous $New Bag 9/3/24 1352)   sodium chloride (PF) 0.9% PF flush 3 mL (has no administration in time range)   sodium chloride (PF) 0.9% PF flush 3 mL (has no administration in time range)   ceFAZolin (ANCEF) 3 g in  mL intermittent infusion (3 g Intravenous $New Bag 9/3/24 1411)        Procedures   Procedures     Discussion of Management   1332: Discussed the case with pharmacy, recommended Ancef for treatment of cellulitis  1445: Discussed the case with Dr. Tracey, hospitalist who agreed to accept the patient for admission.   Patient was seen in conjunction with Dr. Camarena  ED Course        Additional Documentation  None    Medical Decision Making / Diagnosis     CMS Diagnoses: None    MIPS       None    [facility]   Seth Mcdaniels is a 88 year old male who presents for evaluation of left leg redness.  On exam, the patient has audible wheezing with regular rate and rhythm and nearly circumferential evidence of cellulitis of the left lower extremity.  Vital signs show initial low blood pressure with a reading of 104/57 which improved throughout ED course.  There is no evidence of fever, tachycardia, or hypoxia.  Blood work shows evidence of significant leukocytosis at nearly 24,000 and CASSI with creatinine of 1.3.  The patient is mildly anemic as well which appears to be consistent for bleeding 1 month ago and there are no other electrolyte abnormalities.  Lactate is within normal limits.  Blood cultures are pending.  EKG showed a ventricular paced rhythm without any evidence of ischemia.  The patient is anticoagulant on Xarelto and therefore I have low suspicion for DVT.  The patient was given 500 mL of fluid and Ancef for treatment of cellulitis and possible early sepsis.  I discussed the case with Dr. Tracey, hospitalist, who agreed to accept patient for admission.  These findings and this plan were reviewed with the patient and his family as well who are also in agreement with this plan and all questions were answered.    Disposition   The patient was admitted to the hospital.     Diagnosis     ICD-10-CM    1. Cellulitis of left lower leg  L03.116       2. Leukocytosis, unspecified type  D72.829       3. CASSI (acute kidney injury) (H24)  N17.9            Tammy Luo PA-C        Tammy Luo PA-C  09/03/24 190

## 2024-09-04 LAB
ANION GAP SERPL CALCULATED.3IONS-SCNC: 9 MMOL/L (ref 7–15)
BASOPHILS # BLD MANUAL: 0 10E3/UL (ref 0–0.2)
BASOPHILS NFR BLD MANUAL: 0 %
BUN SERPL-MCNC: 25.4 MG/DL (ref 8–23)
CALCIUM SERPL-MCNC: 8.4 MG/DL (ref 8.8–10.4)
CHLORIDE SERPL-SCNC: 101 MMOL/L (ref 98–107)
CREAT SERPL-MCNC: 1.31 MG/DL (ref 0.67–1.17)
EGFRCR SERPLBLD CKD-EPI 2021: 52 ML/MIN/1.73M2
EOSINOPHIL # BLD MANUAL: 0 10E3/UL (ref 0–0.7)
EOSINOPHIL NFR BLD MANUAL: 0 %
ERYTHROCYTE [DISTWIDTH] IN BLOOD BY AUTOMATED COUNT: 13.7 % (ref 10–15)
GLUCOSE SERPL-MCNC: 106 MG/DL (ref 70–99)
HCO3 SERPL-SCNC: 27 MMOL/L (ref 22–29)
HCT VFR BLD AUTO: 34 % (ref 40–53)
HGB BLD-MCNC: 10.9 G/DL (ref 13.3–17.7)
LYMPHOCYTES # BLD MANUAL: 0.7 10E3/UL (ref 0.8–5.3)
LYMPHOCYTES NFR BLD MANUAL: 5 %
MCH RBC QN AUTO: 32.2 PG (ref 26.5–33)
MCHC RBC AUTO-ENTMCNC: 32.1 G/DL (ref 31.5–36.5)
MCV RBC AUTO: 100 FL (ref 78–100)
MONOCYTES # BLD MANUAL: 0.8 10E3/UL (ref 0–1.3)
MONOCYTES NFR BLD MANUAL: 6 %
NEUTROPHILS # BLD MANUAL: 12.3 10E3/UL (ref 1.6–8.3)
NEUTROPHILS NFR BLD MANUAL: 89 %
NRBC # BLD AUTO: 0 10E3/UL
NRBC BLD AUTO-RTO: 0 /100
PLAT MORPH BLD: ABNORMAL
PLATELET # BLD AUTO: 143 10E3/UL (ref 150–450)
POTASSIUM SERPL-SCNC: 4 MMOL/L (ref 3.4–5.3)
RBC # BLD AUTO: 3.39 10E6/UL (ref 4.4–5.9)
RBC MORPH BLD: ABNORMAL
SMUDGE CELLS BLD QL SMEAR: PRESENT
SODIUM SERPL-SCNC: 137 MMOL/L (ref 135–145)
WBC # BLD AUTO: 13.8 10E3/UL (ref 4–11)

## 2024-09-04 PROCEDURE — 250N000013 HC RX MED GY IP 250 OP 250 PS 637: Performed by: INTERNAL MEDICINE

## 2024-09-04 PROCEDURE — G0463 HOSPITAL OUTPT CLINIC VISIT: HCPCS

## 2024-09-04 PROCEDURE — 99233 SBSQ HOSP IP/OBS HIGH 50: CPT | Performed by: PHYSICIAN ASSISTANT

## 2024-09-04 PROCEDURE — 80048 BASIC METABOLIC PNL TOTAL CA: CPT | Performed by: INTERNAL MEDICINE

## 2024-09-04 PROCEDURE — 250N000013 HC RX MED GY IP 250 OP 250 PS 637: Performed by: PHYSICIAN ASSISTANT

## 2024-09-04 PROCEDURE — 36415 COLL VENOUS BLD VENIPUNCTURE: CPT | Performed by: INTERNAL MEDICINE

## 2024-09-04 PROCEDURE — 250N000011 HC RX IP 250 OP 636: Mod: JZ | Performed by: INTERNAL MEDICINE

## 2024-09-04 PROCEDURE — 85027 COMPLETE CBC AUTOMATED: CPT | Performed by: INTERNAL MEDICINE

## 2024-09-04 PROCEDURE — 120N000001 HC R&B MED SURG/OB

## 2024-09-04 PROCEDURE — 85007 BL SMEAR W/DIFF WBC COUNT: CPT | Performed by: INTERNAL MEDICINE

## 2024-09-04 RX ORDER — FUROSEMIDE 20 MG
40 TABLET ORAL DAILY
Status: ON HOLD | COMMUNITY
End: 2024-09-06

## 2024-09-04 RX ORDER — METOPROLOL SUCCINATE 25 MG/1
25 TABLET, EXTENDED RELEASE ORAL EVERY MORNING
Status: DISCONTINUED | OUTPATIENT
Start: 2024-09-04 | End: 2024-09-06 | Stop reason: HOSPADM

## 2024-09-04 RX ORDER — FUROSEMIDE 40 MG
40 TABLET ORAL DAILY
Status: DISCONTINUED | OUTPATIENT
Start: 2024-09-04 | End: 2024-09-04

## 2024-09-04 RX ORDER — MULTIPLE VITAMINS W/ MINERALS TAB 9MG-400MCG
1 TAB ORAL DAILY
Status: DISCONTINUED | OUTPATIENT
Start: 2024-09-04 | End: 2024-09-06 | Stop reason: HOSPADM

## 2024-09-04 RX ORDER — CEFAZOLIN SODIUM 2 G/100ML
2 INJECTION, SOLUTION INTRAVENOUS EVERY 8 HOURS
Status: DISCONTINUED | OUTPATIENT
Start: 2024-09-04 | End: 2024-09-06 | Stop reason: HOSPADM

## 2024-09-04 RX ADMIN — Medication 1 TABLET: at 12:12

## 2024-09-04 RX ADMIN — CEFAZOLIN SODIUM 2 G: 2 INJECTION, SOLUTION INTRAVENOUS at 01:11

## 2024-09-04 RX ADMIN — RIVAROXABAN 20 MG: 20 TABLET, FILM COATED ORAL at 16:58

## 2024-09-04 RX ADMIN — BUPROPION HYDROCHLORIDE 150 MG: 150 TABLET, EXTENDED RELEASE ORAL at 09:51

## 2024-09-04 RX ADMIN — ACETAMINOPHEN 325MG 650 MG: 325 TABLET ORAL at 21:12

## 2024-09-04 RX ADMIN — ASPIRIN 81 MG: 81 TABLET, COATED ORAL at 09:51

## 2024-09-04 RX ADMIN — FERROUS SULFATE TAB 325 MG (65 MG ELEMENTAL FE) 325 MG: 325 (65 FE) TAB at 09:51

## 2024-09-04 RX ADMIN — ACETAMINOPHEN 325MG 650 MG: 325 TABLET ORAL at 17:21

## 2024-09-04 RX ADMIN — SERTRALINE 100 MG: 100 TABLET, FILM COATED ORAL at 09:50

## 2024-09-04 RX ADMIN — FUROSEMIDE 40 MG: 40 TABLET ORAL at 12:12

## 2024-09-04 RX ADMIN — CEFAZOLIN SODIUM 2 G: 2 INJECTION, SOLUTION INTRAVENOUS at 09:51

## 2024-09-04 RX ADMIN — METOPROLOL SUCCINATE 25 MG: 25 TABLET, EXTENDED RELEASE ORAL at 12:12

## 2024-09-04 RX ADMIN — CEFAZOLIN SODIUM 2 G: 2 INJECTION, SOLUTION INTRAVENOUS at 16:58

## 2024-09-04 RX ADMIN — ATORVASTATIN CALCIUM 40 MG: 40 TABLET, FILM COATED ORAL at 20:12

## 2024-09-04 RX ADMIN — DICLOFENAC SODIUM 2 G: 10 GEL TOPICAL at 20:13

## 2024-09-04 ASSESSMENT — ACTIVITIES OF DAILY LIVING (ADL)
ADLS_ACUITY_SCORE: 29
ADLS_ACUITY_SCORE: 31
ADLS_ACUITY_SCORE: 29
ADLS_ACUITY_SCORE: 31
ADLS_ACUITY_SCORE: 29
ADLS_ACUITY_SCORE: 31
ADLS_ACUITY_SCORE: 29
ADLS_ACUITY_SCORE: 31
ADLS_ACUITY_SCORE: 29
ADLS_ACUITY_SCORE: 31
ADLS_ACUITY_SCORE: 31
ADLS_ACUITY_SCORE: 29
ADLS_ACUITY_SCORE: 29
ADLS_ACUITY_SCORE: 32
ADLS_ACUITY_SCORE: 31
ADLS_ACUITY_SCORE: 32
ADLS_ACUITY_SCORE: 31

## 2024-09-04 NOTE — PROGRESS NOTES
INPATIENT CARE PLAN GOAL 6726-2054:  OUTPATIENT/OBSERVATION GOALS TO BE MET BEFORE DISCHARGE:  1. Pain Status: Pain free.    2. Return to near baseline physical activity: No    3. Cleared for discharge by consultants (if involved): No    Discharge Planner Nurse   Safe discharge environment identified: No  Barriers to discharge: Yes       Entered by: Bertha Montejo, KATLIN 09/04/2024 2:53 PM     VSS, afeb today.Lungs dim.mid to Ll's júnior. + BS x 4 quads.Offers no c/o pain thus far.WOC RN in this AM to assess L LE cellulitis.Pt. has discoloration to his shins, especially L shin.There are 2 open areas on front of shin,WOC RN to make recommendations for dressing changes.LE's currently open to air. Up to BR with walker and assist of 1.Good appetite.Currently napping comfortably in bed. Will con't to monitor and treat for pain as needed.Will alert staff with any changes.    Please review provider order for any additional goals.   Nurse to notify provider when observation goals have been met and patient is ready for discharge.

## 2024-09-04 NOTE — PROGRESS NOTES
[facility]    Medicine Progress Note - Hospitalist Service    Date of Admission: 9/3/2024    Assessment & Plan   Seth Mcdaniels is a 88 year old male with complicated medical history significant for chronic atrial fibrillation on DOAC, recent hospitalization and TCU discharge after a mechanical fall with complicated rectus sheath hematoma, recent hypoxic respiratory failure due to underlying COVID-19 infection and hospital-acquired pneumonia, KALEIGH, morbid obesity, benign essential hypertension, s/p PPM, chronic congestive heart failure with preserved EF, history of CVA, depression, anxiety, stable anemia, and suspected CKD with previous elevated creatinine who was sent to [facility] ED on 9/3/2024 from Urgent Care due to concerns for left leg redness and was noted of having soft blood pressure levels with concerns for underlying sepsis.  He mentioned that he was in his usual state of health until 1 day prior to this presentation when he noticed this increasing left leg redness and accompanied with weeping edema.     #SIRS versus early sepsis due to left LE cellulitis  #Chronic bilateral LE edema, likely mostly related to venous insufficiency   Presented with subjective chills, tachypnea, leukocytosis of 23.9, hypotension in outpatient clinic setting with BP of 87/52.   -continue IV Ancef   -elevate bilateral LE  -WOC consult for wound care   -monitor for fevers, currently afebrile  -leukocytosis improving, follow CBC  -received 500 ml NS bolus in ED, hold off on further IVFs with history of heart failure and monitor, encourage oral intake   -PRN Tylenol for pain   -continue PTA Lasix 40 mg daily  -PT consult for mobility assessment, typically ambulatory with a walker      #Chronic A-fib on Xarelto  Rate controlled  -continue PTA Metoprolol with parameters and Xarelto     #Chronic congestive hear failure   Appears to be slightly hypervolemic. Previous baseline weight around 347 pounds at home and  down to 352 pounds when discharging from TCU on 8/4. Appears his Lasix was increased from 40 to 60 mg of recent. Not currently hypoxic and denies shortness of breath.   -bilateral LE edema complicated but acute infection as noted above  -daily weights, monitor I&Os  -continue PTA Lasix and Metoprolol as BP allows     #Acute on chronic anemia  #Previous rectal sheath hematoma (7/26/2024)  Baseline hemoglobin of 12-13   Hgb currently stable at 10.9  -continue ferrous sulfate    #CAD  #HTN  #Dyslipidemia   BP currently stable  -continue PTA Lasix 60 mg, Metoprolol, ASA, and Atorvastatin     #H/o CVA  Continue PTA Xarelto, Atorvastatin, and ASA     #CKD stage 2  Baseline creatinine of 0.8-1.0 with recent creatinine slightly elevated.   -creatinine slightly increased at 1.3 since admission  -monitor BMP closely with continuing increased dose of Lasix     #Depression, anxiety  -continue PTA Bupropion and Sertraline     #Morbid obesity  #KALEIGH  Complicates care, does not use CPAP  -PRN supplemental oxygen while sleeping     #Cognitive impairment  SLUMS of 24/30 during recent TCU stay. Deemed okay to live alone with weekly check ins.        Diet: Combination Diet Regular Diet Adult    DVT Prophylaxis: DOAC  Benedict Catheter: Not present  Lines: None     Cardiac Monitoring: None  Code Status: No CPR- Do NOT Intubate      Clinically Significant Risk Factors Present on Admission          # Hypocalcemia: Lowest Ca = 8.4 mg/dL in last 2 days, will monitor and replace as appropriate     # Hypoalbuminemia: Lowest albumin = 3.4 g/dL at 9/3/2024 12:17 PM, will monitor as appropriate  # Drug Induced Coagulation Defect: home medication list includes an anticoagulant medication  # Drug Induced Platelet Defect: home medication list includes an antiplatelet medication      # Anemia: based on hgb <11       # Severe Obesity: Estimated body mass index is 49.93 kg/m  as calculated from the following:    Height as of this encounter: 1.778 m (5'  "10\").    Weight as of this encounter: 157.9 kg (348 lb).                Disposition Plan     Medically Ready for Discharge: Anticipated in 2-4 Days       The patient's care was discussed with the Bedside Nurse and Patient.    Tavia Burgess PA-C  Hospitalist Service  [facility]  Securely message with [employer] (more info)  Text page via [facility] Paging/Directory   ______________________________________________________________________    Interval History   Patient states \"feel great\".  He denies any focal areas of pain but is tender over left lower tibia on exam.  He believes the swelling in his bilateral legs is better.  Denies baseline erythema to his lower leg and states still present over his left shin.  He is tolerating adequate oral intake.  Denies any chills, diaphoresis, nausea, vomiting, chest pain, or shortness of breath.    Physical Exam   Vital Signs: Temp: 98.6  F (37  C) Temp src: Oral BP: 128/62 Pulse: 81   Resp: 20 SpO2: 93 % O2 Device: None (Room air)    Weight: 348 lbs 0 oz    General Appearance: Laying in bed, pleasant, NAD  Respiratory: CTAB without wheezing or rales   Cardiovascular: irregularly irregular rhythm with normal rate, no murmur appreciated   GI: soft, nontender, nondistended, normoactive bowel sounds   Skin: warm, Left LE: from knee down there are chronic venous stasis changes with bright erythema present and associated swelling and warmth, bilateral LEs with 2+ pitting edema (left > right), slightly tender with palpation over left lower leg    Medical Decision Making       60 MINUTES SPENT BY ME on the date of service doing chart review, history, exam, documentation & further activities per the note.      Data   ------------------------- PAST 24 HR DATA REVIEWED -----------------------------------------------  "

## 2024-09-04 NOTE — PROGRESS NOTES
Called main pharmacy for med verification.. med rec not performed yet, Furosemide requested for decreasing the fluid retention in his lower extremities.

## 2024-09-04 NOTE — PLAN OF CARE
"Goal Outcome Evaluation:      Plan of Care Reviewed With: patient    Overall Patient Progress: improvingOverall Patient Progress: improving    Outcome Evaluation: PT is a&ox4, VSS. Pt states resting there is no pain but in lower extremities moving or being touched pain shoots up to a 8/10. Pain managed w/tylenol as his prefrenfece. IV Ancef q.8hrs. Schedulded meds provided. WOC nurse has done dressing changes so none will be completed on shift. IV saline locked. Up and OOB ww/ a/1 gait belt and walker. Blue wedge pillow ordered per WOC nurse recommendation.. pillows in use for now. On right leg, left leg is utilizing blue wedge.Fine crackles heard in lungs.      Problem: Adult Inpatient Plan of Care  Goal: Plan of Care Review  Description: The Plan of Care Review/Shift note should be completed every shift.  The Outcome Evaluation is a brief statement about your assessment that the patient is improving, declining, or no change.  This information will be displayed automatically on your shift  note.  Outcome: Progressing  Flowsheets (Taken 9/4/2024 1812)  Outcome Evaluation: PT is a&ox4, VSS. Pt states resting there is no pain but in lower extremities moving or being touched pain shoots up to a 8/10. Pain managed w/tylenol as his prefrenfece. IV Ancef q.8hrs. Schedulded meds provided. WO nurse has done dressing changes so none will be completed on shift. IV saline locked. Up and OOB ww/ a/1 gait belt and walker. Blue wedge pillow ordered per WOC nurse recommendation.. pillows in use for now.  Plan of Care Reviewed With: patient  Overall Patient Progress: improving  Goal: Patient-Specific Goal (Individualized)  Description: You can add care plan individualizations to a care plan. Examples of Individualization might be:  \"Parent requests to be called daily at 9am for status\", \"I have a hard time hearing out of my right ear\", or \"Do not touch me to wake me up as it startles  me\".  Outcome: Progressing  Goal: Absence of " Hospital-Acquired Illness or Injury  Outcome: Progressing  Goal: Optimal Comfort and Wellbeing  Outcome: Progressing  Intervention: Monitor Pain and Promote Comfort  Recent Flowsheet Documentation  Taken 9/4/2024 1803 by Elva Rico RN  Pain Management Interventions: medication (see MAR)  Goal: Readiness for Transition of Care  Outcome: Progressing     Problem: Fall Injury Risk  Goal: Absence of Fall and Fall-Related Injury  Outcome: Progressing     Problem: Pain Acute  Goal: Optimal Pain Control and Function  Outcome: Progressing  Intervention: Develop Pain Management Plan  Recent Flowsheet Documentation  Taken 9/4/2024 1803 by Elva Rico RN  Pain Management Interventions: medication (see MAR)

## 2024-09-04 NOTE — CONSULTS
[facility] Nurse Inpatient Assessment     Consulted for: LLE    Patient History (according to provider note(s):      Seth Mcdaniels is a 88 year old male with complicated medical history significant for chronic anticoagulated state with DOAC with longstanding history of chronic atrial fibrillation, recent hospitalization and TCU discharge after a mechanical fall with complicated rectus sheath hematoma, recent hypoxic respiratory failure with underlying COVID-19 infection and hospital-acquired pneumonia, KALEIGH, morbid obesity, benign essential hypertension, prior pacemaker insertion, chronic congestive heart failure with preserved EF, history of CVA, depression anxiety and stable anemia, suspected CKD with previous elevated creatinine who was sent here from his clinic providers office due to concerns for left leg redness and was noted of having soft blood pressure levels with concerns for underlying sepsis.  He mentioned that he was in his usual state of health until 1 day prior to this presentation when he noticed this increasing left leg redness and accompanied with weeping edema.     Assessment:      Areas visualized during today's visit: Focused:    Wound location: Left leg  Last photo: 9/4/24    Wound due to: Venous Ulcer  Wound history/plan of care: Patient reports area became painful and started to drain about 2 days prior to admission.  Patient usually wears compression but these have been off for a few days while waiting for new ones to arrive which his nurse ordered.    Wound base: Shin with 1x0.7cm area of dermis and scattered areas of dry drainage ranging from pinpoint to 0.6x1cm      Palpation of the wound bed: normal      Drainage: small     Description of drainage: serosanguinous     Measurements (length x width x depth, in cm): see above      Tunneling: N/A     Undermining: N/A  Periwound skin: Hemosiderin staining-patient reports legs are about the same color as normal       Color: purple      Temperature: warm  Odor: none  Pain: mild  Pain interventions prior to dressing change: slow and gentle cares   Treatment goal: Heal   STATUS: initial assessment  Supplies ordered: gathered       Treatment Plan:     Left shin wound(s): Every 3 days  Cleanse with wound cleanser and dry  Apply Mepilex 4x4     Orders: Written    RECOMMEND PRIMARY TEAM ORDER: Lymphedema consult in one more day  Education provided: plan of care  Discussed plan of care with: Patient and Nurse  WO nurse follow-up plan: weekly  Notify WOC if wound(s) deteriorate.  Nursing to notify the Provider(s) and re-consult the WO Nurse if new skin concern.    DATA:     Current support surface: Standard  Standard gel mattress (Isoflex)  Containment of urine/stool: Continent of bladder and Incontinence Protocol  BMI: Body mass index is 49.93 kg/m .   Active diet order: Orders Placed This Encounter      Combination Diet Regular Diet Adult     Output: I/O last 3 completed shifts:  In: 400 [P.O.:400]  Out: 450 [Urine:450]     Labs:   Recent Labs   Lab 09/04/24  0545 09/03/24  1217   ALBUMIN  --  3.4*   HGB 10.9* 11.5*   WBC 13.8* 23.9*     Pressure injury risk assessment:   Sensory Perception: 3-->slightly limited  Moisture: 3-->occasionally moist  Activity: 3-->walks occasionally  Mobility: 3-->slightly limited  Nutrition: 3-->adequate  Friction and Shear: 3-->no apparent problem  Jaciel Score: 18    Brandon Zaidi RN CWOCN  Contact Via [facility] Nurse (Jun)  Dept. Office Number: [phone]

## 2024-09-04 NOTE — PLAN OF CARE
INPATIENT CARE PLAN GOAL 2300 - 0700:    Goal Outcome Evaluation:      Plan of Care Reviewed With: patient    Overall Patient Progress: no changeOverall Patient Progress: no change    Outcome Evaluation: A&Ox4, VSS on RA. C/o 2-3/10 LLE pain, declines need for intervention. BLE libertad, LLE with drsg in place, mild amt purulent drainage. IV ancef q8h. WOC, PT, SW consulted. Up SBA w/ walker.

## 2024-09-04 NOTE — PROGRESS NOTES
PT LLE wrapped in kerlix and some Vaseline strips to cover open wounds. Legs elevated. PT also provided the Hearing aid requested..

## 2024-09-04 NOTE — PHARMACY-ADMISSION MEDICATION HISTORY
Pharmacist Admission Medication History    Admission medication history is complete. The information provided in this note is only as accurate as the sources available at the time of the update.    Information Source(s): Caregiver and CareEverywhere/SureScripts via phone    Pertinent Information: Called Lynda  (friend) who helps with meds. She states it is difficult to get him to take his meds some of the time.  He took meds up through Sat, then none on Sunday, Monday was only pm meds and none on Tuesday.    Lasix - New RX was sent for 40 mg lasix - take 1&1/2 tablets - 60  mg.  They had a bottle of 20 mg tabs at home and where to finish that first but she was only giving him 2 tabs so dose of 40 mg    Changes made to PTA medication list:  Added: Prevagen  Deleted: Nystatin, duoneb  Changed: Triamcinolone, Miralax, bactroban, lasix, diclofenac    Allergies reviewed with patient and updates made in EHR: yes    Medication History Completed By: Janneth Moya [facility] 9/4/2024 9:08 AM    PTA Med List   Medication Sig Last Dose    acetaminophen (TYLENOL) 325 MG tablet Take 650 mg by mouth every 4 hours as needed for mild pain 9/3/2024    Apoaequorin (PREVAGEN PO) Take 1 capsule by mouth daily. 8/31/2024 at am    aspirin (ASA) 81 MG EC tablet Take 1 tablet (81 mg) by mouth daily 8/31/2024 at am    atorvastatin (LIPITOR) 40 MG tablet Take 40 mg by mouth every evening 9/2/2024 at pm    buPROPion (WELLBUTRIN XL) 150 MG 24 hr tablet Take 150 mg by mouth every morning for anxiousness associated with  depression. 8/31/2024 at am    carboxymethylcellulose (REFRESH PLUS) 0.5 % SOLN ophthalmic solution Place 2 drops into both eyes every 2 hours as needed for dry eyes Unknown    clobetasol (TEMOVATE) 0.05 % external ointment Apply topically 2 times daily  Apply  to BLE topically two times a day for Rash and  nonspecific skin eruption. Tinea, pedis for 2 Weeks  Lower legs until follow up in 2 weeks. 9/2/2024    diclofenac (VOLTAREN)  1 % topical gel Apply 2 g topically 2 times daily. 9/3/2024    ferrous sulfate (FEROSUL) 325 (65 Fe) MG tablet Take 325 mg by mouth daily (with breakfast) 8/31/2024 at am    Flaxseed, Linseed, (FLAXSEED OIL) 1000 MG CAPS Take 1,000 mg by mouth daily 8/31/2024 at am    furosemide (LASIX) 20 MG tablet Take 40 mg by mouth daily. 8/31/2024 at am    ketoconazole (NIZORAL) 2 % external cream Apply topically 2 times daily Apply to Left upper  arm topically two times a day for skin infection due to  the fungus candida also apply to interdigital spaces 9/2/2024    metoprolol succinate ER (TOPROL XL) 25 MG 24 hr tablet Take 25 mg by mouth every morning htn 8/31/2024 at am    Multiple Vitamins-Minerals (PRESERVISION AREDS 2 PO) Take 1 tablet by mouth 2 times daily 9/2/2024 at pm    mupirocin (BACTROBAN) 2 % external ointment Apply topically daily. 9/2/2024    nitroGLYcerin (NITROSTAT) 0.4 MG sublingual tablet Place 0.4 mg under the tongue every 5 minutes as needed for chest pain     polyethylene glycol (MIRALAX) 17 g packet Take 17 g by mouth daily as needed. constipation More than a month    rivaroxaban ANTICOAGULANT (XARELTO) 20 MG TABS tablet Take 1 tablet (20 mg) by mouth daily (with dinner) 9/2/2024    senna-docusate (SENOKOT-S/PERICOLACE) 8.6-50 MG tablet Take 1 tablet by mouth 2 times daily as needed for constipation Unknown    SERTRALINE HCL PO Take 100 mg by mouth every morning  8/31/2024 at am    triamcinolone (KENALOG) 0.1 % external cream 2 times daily. 9/2/2024

## 2024-09-04 NOTE — PLAN OF CARE
Goal Outcome Evaluation:      Plan of Care Reviewed With: patient    Overall Patient Progress: no changeOverall Patient Progress: no change    Outcome Evaluation: PT a/o x4. Pokagon auxillary aids provided per request. PT is up stand by assist w/walker and gait belt with fall precuations in place. Meds awaiting pharmacy reconcilation, furosemide provided bcuz verified by pharmacy and due. PT IV dressing changed and flushed due to leaking at IV site. Pt assisted to bathroom on shift, large loose Bm and urination done. 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 Food order palced per Asssistive personal due to PTs inability to see the menu. LLE open blisters and cellulitis wrapped in kerlix and vaseline gauze to prevent further bu,ping. Legs and HOB elevated. No c/o of pain.      Problem: Adult Inpatient Plan of Care  Goal: Plan of Care Review  Description: The Plan of Care Review/Shift note should be completed every shift.  The Outcome Evaluation is a brief statement about your assessment that the patient is improving, declining, or no change.  This information will be displayed automatically on your shift  note.  9/3/2024 2301 by Elva Rico RN  Outcome: Not Progressing  Flowsheets (Taken 9/3/2024 2301)  Outcome Evaluation: PT a/o x4. Pokagon auxillary aids provided per request. PT is up stand by assist w/walker and gait belt with fall precuations in place. Meds awaiting pharmacy reconcilation, furosemide provided bcuz verified by pharmacy and due. PT IV dressing changed and flushed due to leaking at IV site. Pt assisted to bathroom on shift, large loose Bm and urination done. 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 Food order palced per Asssistive personal due to PTs inability to see the menu. LLE open blisters and cellulitis wrapped in kerlix and vaseline gauze to prevent further bu,ping. Legs and HOB elevated. No c/o of pain.  Plan of Care Reviewed With: patient  Overall Patient Progress: no change  9/3/2024 1809 by Elva Rico,  "RN  Outcome: Progressing  Flowsheets (Taken 9/3/2024 1809)  Outcome Evaluation: PT A/O x4, pt Stebbins order placed for auxillary aids, PT VSS upon arrival to floor. PT has fine crackles in bases of lungs with infrequent productive cough. PT is A1 w/walker. Fall precautions in place, Bed alarm on. PT is noted to have increased LLE with open blisters and brusing from cellulitis. 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 Food order read and delgeated order to aid to place order. PT is noting SOB with ambulation.  Plan of Care Reviewed With: patient  Overall Patient Progress: no change  Goal: Patient-Specific Goal (Individualized)  Description: You can add care plan individualizations to a care plan. Examples of Individualization might be:  \"Parent requests to be called daily at 9am for status\", \"I have a hard time hearing out of my right ear\", or \"Do not touch me to wake me up as it startles  me\".  9/3/2024 2301 by Elva Rico RN  Outcome: Not Progressing  9/3/2024 1809 by Elva Rico RN  Outcome: Progressing  Goal: Absence of Hospital-Acquired Illness or Injury  9/3/2024 2301 by Elva Rico RN  Outcome: Not Progressing  9/3/2024 1809 by Elva Rico RN  Outcome: Progressing  Intervention: Prevent and Manage VTE (Venous Thromboembolism) Risk  Recent Flowsheet Documentation  Taken 9/3/2024 1716 by Elva Rico RN  VTE Prevention/Management:   compression stockings off   SCDs off (sequential compression devices)  Goal: Optimal Comfort and Wellbeing  9/3/2024 2301 by Elva Rico RN  Outcome: Not Progressing  9/3/2024 1809 by Elva Rico RN  Outcome: Progressing  Goal: Readiness for Transition of Care  9/3/2024 2301 by Elva Rico RN  Outcome: Not Progressing  9/3/2024 1809 by Elva Rico RN  Outcome: Progressing  Intervention: Mutually Develop Transition Plan  Recent Flowsheet Documentation  Taken 9/3/2024 1700 by Elva Rico RN  Equipment " Currently Used at Home: walker, rolling     Problem: Fall Injury Risk  Goal: Absence of Fall and Fall-Related Injury  9/3/2024 2301 by Elva Rico RN  Outcome: Not Progressing  9/3/2024 1809 by Elva Rico RN  Outcome: Progressing     Problem: Pain Acute  Goal: Optimal Pain Control and Function  9/3/2024 2301 by Elva Rico RN  Outcome: Not Progressing  9/3/2024 1809 by Elva Rico RN  Outcome: Progressing

## 2024-09-04 NOTE — CONSULTS
SPIRITUAL HEALTH SERVICES - Consult Note      Referral Source/ Reason for Visit: Staff consult for emotional support.    Summary and Recommendations -     Patient articulated the importance of his Jehovah's witness henry.  He expressed gratitude for receiving communion from a eucharistic  this morning.     Plan: He asked me to call his parish, [employer] [place]; in order to place him on the prayer list, which I did.    No further needs.  [facility] remains available upon request.    Rev. RAYMUNDO Dorantes.  Staff     [facility] available 24/7 for emergent requests/ referrals, either by paging the on-call  or by entering an ASAP/STAT consult in [employer], which will also page the on-call .      Assessment    Saw pt Seth Mcdaniels regarding staff consult for emotional support.    Patient/Family Understanding of Illness and Goals of Care - He states that he is at  due to cellulitis and other hospital problems.    Distress and Loss - Patient indicated that he has been addressing numerous health issues.  This is overwhelming and stressful.    Strengths, Coping and Resources - He named his daughter as  a supportive person in his life.    Meaning, Beliefs and Spirituality - Patient is Jehovah's witness.  He is a [facility] [employer] in [place].

## 2024-09-05 ENCOUNTER — APPOINTMENT (OUTPATIENT)
Dept: PHYSICAL THERAPY | Facility: CLINIC | Age: 89
DRG: 872 | End: 2024-09-05
Attending: INTERNAL MEDICINE
Payer: COMMERCIAL

## 2024-09-05 LAB
ANION GAP SERPL CALCULATED.3IONS-SCNC: 11 MMOL/L (ref 7–15)
BASOPHILS # BLD AUTO: 0 10E3/UL (ref 0–0.2)
BASOPHILS # BLD AUTO: 0 10E3/UL (ref 0–0.2)
BASOPHILS # BLD MANUAL: 0 10E3/UL (ref 0–0.2)
BASOPHILS NFR BLD AUTO: 0 %
BASOPHILS NFR BLD AUTO: 0 %
BASOPHILS NFR BLD MANUAL: 0 %
BUN SERPL-MCNC: 25.9 MG/DL (ref 8–23)
CALCIUM SERPL-MCNC: 8.5 MG/DL (ref 8.8–10.4)
CHLORIDE SERPL-SCNC: 103 MMOL/L (ref 98–107)
CREAT SERPL-MCNC: 1.09 MG/DL (ref 0.67–1.17)
EGFRCR SERPLBLD CKD-EPI 2021: 65 ML/MIN/1.73M2
EOSINOPHIL # BLD AUTO: 0 10E3/UL (ref 0–0.7)
EOSINOPHIL # BLD AUTO: 0.1 10E3/UL (ref 0–0.7)
EOSINOPHIL # BLD MANUAL: 0 10E3/UL (ref 0–0.7)
EOSINOPHIL NFR BLD AUTO: 0 %
EOSINOPHIL NFR BLD AUTO: 1 %
EOSINOPHIL NFR BLD MANUAL: 0 %
ERYTHROCYTE [DISTWIDTH] IN BLOOD BY AUTOMATED COUNT: 13.4 % (ref 10–15)
ERYTHROCYTE [DISTWIDTH] IN BLOOD BY AUTOMATED COUNT: 13.8 % (ref 10–15)
GLUCOSE SERPL-MCNC: 101 MG/DL (ref 70–99)
HCO3 SERPL-SCNC: 25 MMOL/L (ref 22–29)
HCT VFR BLD AUTO: 32.3 % (ref 40–53)
HCT VFR BLD AUTO: 35.5 % (ref 40–53)
HGB BLD-MCNC: 10.5 G/DL (ref 13.3–17.7)
HGB BLD-MCNC: 11.5 G/DL (ref 13.3–17.7)
IMM GRANULOCYTES # BLD: 0.1 10E3/UL
IMM GRANULOCYTES # BLD: 0.2 10E3/UL
IMM GRANULOCYTES NFR BLD: 1 %
IMM GRANULOCYTES NFR BLD: 1 %
LYMPHOCYTES # BLD AUTO: 0.9 10E3/UL (ref 0.8–5.3)
LYMPHOCYTES # BLD AUTO: 1.1 10E3/UL (ref 0.8–5.3)
LYMPHOCYTES # BLD MANUAL: 1 10E3/UL (ref 0.8–5.3)
LYMPHOCYTES NFR BLD AUTO: 10 %
LYMPHOCYTES NFR BLD AUTO: 5 %
LYMPHOCYTES NFR BLD MANUAL: 4 %
MCH RBC QN AUTO: 32.1 PG (ref 26.5–33)
MCH RBC QN AUTO: 32.5 PG (ref 26.5–33)
MCHC RBC AUTO-ENTMCNC: 32.4 G/DL (ref 31.5–36.5)
MCHC RBC AUTO-ENTMCNC: 32.5 G/DL (ref 31.5–36.5)
MCV RBC AUTO: 100 FL (ref 78–100)
MCV RBC AUTO: 99 FL (ref 78–100)
MONOCYTES # BLD AUTO: 0.9 10E3/UL (ref 0–1.3)
MONOCYTES # BLD AUTO: 4 10E3/UL (ref 0–1.3)
MONOCYTES # BLD MANUAL: 4.3 10E3/UL (ref 0–1.3)
MONOCYTES NFR BLD AUTO: 11 %
MONOCYTES NFR BLD AUTO: 17 %
MONOCYTES NFR BLD MANUAL: 18 %
NEUTROPHILS # BLD AUTO: 18.7 10E3/UL (ref 1.6–8.3)
NEUTROPHILS # BLD AUTO: 6.7 10E3/UL (ref 1.6–8.3)
NEUTROPHILS # BLD MANUAL: 18.6 10E3/UL (ref 1.6–8.3)
NEUTROPHILS NFR BLD AUTO: 78 %
NEUTROPHILS NFR BLD AUTO: 78 %
NEUTROPHILS NFR BLD MANUAL: 78 %
NRBC # BLD AUTO: 0 10E3/UL
NRBC # BLD AUTO: 0 10E3/UL
NRBC BLD AUTO-RTO: 0 /100
NRBC BLD AUTO-RTO: 0 /100
PATH REV: ABNORMAL
PLAT MORPH BLD: ABNORMAL
PLATELET # BLD AUTO: 122 10E3/UL (ref 150–450)
PLATELET # BLD AUTO: 144 10E3/UL (ref 150–450)
POTASSIUM SERPL-SCNC: 4 MMOL/L (ref 3.4–5.3)
RBC # BLD AUTO: 3.23 10E6/UL (ref 4.4–5.9)
RBC # BLD AUTO: 3.58 10E6/UL (ref 4.4–5.9)
RBC MORPH BLD: ABNORMAL
SODIUM SERPL-SCNC: 139 MMOL/L (ref 135–145)
WBC # BLD AUTO: 23.9 10E3/UL (ref 4–11)
WBC # BLD AUTO: 8.7 10E3/UL (ref 4–11)

## 2024-09-05 PROCEDURE — 250N000013 HC RX MED GY IP 250 OP 250 PS 637: Performed by: INTERNAL MEDICINE

## 2024-09-05 PROCEDURE — 36415 COLL VENOUS BLD VENIPUNCTURE: CPT | Performed by: PHYSICIAN ASSISTANT

## 2024-09-05 PROCEDURE — 97161 PT EVAL LOW COMPLEX 20 MIN: CPT | Mod: GP | Performed by: PHYSICAL THERAPIST

## 2024-09-05 PROCEDURE — 250N000013 HC RX MED GY IP 250 OP 250 PS 637: Performed by: PHYSICIAN ASSISTANT

## 2024-09-05 PROCEDURE — 97140 MANUAL THERAPY 1/> REGIONS: CPT | Mod: GP | Performed by: PHYSICAL THERAPIST

## 2024-09-05 PROCEDURE — 80048 BASIC METABOLIC PNL TOTAL CA: CPT | Performed by: PHYSICIAN ASSISTANT

## 2024-09-05 PROCEDURE — 97530 THERAPEUTIC ACTIVITIES: CPT | Mod: GP | Performed by: PHYSICAL THERAPIST

## 2024-09-05 PROCEDURE — 85025 COMPLETE CBC W/AUTO DIFF WBC: CPT | Performed by: PHYSICIAN ASSISTANT

## 2024-09-05 PROCEDURE — 120N000001 HC R&B MED SURG/OB

## 2024-09-05 PROCEDURE — 99232 SBSQ HOSP IP/OBS MODERATE 35: CPT | Performed by: PHYSICIAN ASSISTANT

## 2024-09-05 PROCEDURE — 250N000011 HC RX IP 250 OP 636: Mod: JZ | Performed by: INTERNAL MEDICINE

## 2024-09-05 RX ORDER — NALOXONE HYDROCHLORIDE 0.4 MG/ML
0.2 INJECTION, SOLUTION INTRAMUSCULAR; INTRAVENOUS; SUBCUTANEOUS
Status: DISCONTINUED | OUTPATIENT
Start: 2024-09-05 | End: 2024-09-06 | Stop reason: HOSPADM

## 2024-09-05 RX ORDER — NALOXONE HYDROCHLORIDE 0.4 MG/ML
0.4 INJECTION, SOLUTION INTRAMUSCULAR; INTRAVENOUS; SUBCUTANEOUS
Status: DISCONTINUED | OUTPATIENT
Start: 2024-09-05 | End: 2024-09-06 | Stop reason: HOSPADM

## 2024-09-05 RX ADMIN — OXYCODONE HYDROCHLORIDE 2.5 MG: 5 TABLET ORAL at 19:56

## 2024-09-05 RX ADMIN — BUPROPION HYDROCHLORIDE 150 MG: 150 TABLET, EXTENDED RELEASE ORAL at 09:41

## 2024-09-05 RX ADMIN — CEFAZOLIN SODIUM 2 G: 2 INJECTION, SOLUTION INTRAVENOUS at 17:32

## 2024-09-05 RX ADMIN — FERROUS SULFATE TAB 325 MG (65 MG ELEMENTAL FE) 325 MG: 325 (65 FE) TAB at 09:41

## 2024-09-05 RX ADMIN — ATORVASTATIN CALCIUM 40 MG: 40 TABLET, FILM COATED ORAL at 19:56

## 2024-09-05 RX ADMIN — Medication 1 TABLET: at 09:41

## 2024-09-05 RX ADMIN — ACETAMINOPHEN 325MG 650 MG: 325 TABLET ORAL at 19:56

## 2024-09-05 RX ADMIN — OXYCODONE HYDROCHLORIDE 2.5 MG: 5 TABLET ORAL at 11:07

## 2024-09-05 RX ADMIN — ASPIRIN 81 MG: 81 TABLET, COATED ORAL at 09:40

## 2024-09-05 RX ADMIN — SERTRALINE 100 MG: 100 TABLET, FILM COATED ORAL at 09:40

## 2024-09-05 RX ADMIN — CEFAZOLIN SODIUM 2 G: 2 INJECTION, SOLUTION INTRAVENOUS at 10:55

## 2024-09-05 RX ADMIN — CEFAZOLIN SODIUM 2 G: 2 INJECTION, SOLUTION INTRAVENOUS at 01:01

## 2024-09-05 RX ADMIN — FUROSEMIDE 60 MG: 40 TABLET ORAL at 09:40

## 2024-09-05 RX ADMIN — ACETAMINOPHEN 325MG 650 MG: 325 TABLET ORAL at 10:55

## 2024-09-05 RX ADMIN — RIVAROXABAN 20 MG: 20 TABLET, FILM COATED ORAL at 17:32

## 2024-09-05 RX ADMIN — DICLOFENAC SODIUM 2 G: 10 GEL TOPICAL at 20:01

## 2024-09-05 RX ADMIN — METOPROLOL SUCCINATE 25 MG: 25 TABLET, EXTENDED RELEASE ORAL at 09:41

## 2024-09-05 ASSESSMENT — ACTIVITIES OF DAILY LIVING (ADL)
ADLS_ACUITY_SCORE: 31
ADLS_ACUITY_SCORE: 32
ADLS_ACUITY_SCORE: 31
ADLS_ACUITY_SCORE: 31
DEPENDENT_IADLS:: CLEANING;SHOPPING;MEDICATION MANAGEMENT
ADLS_ACUITY_SCORE: 31

## 2024-09-05 NOTE — PLAN OF CARE
Goal Outcome Evaluation:      Plan of Care Reviewed With: patient    Overall Patient Progress: improvingOverall Patient Progress: improving    Outcome Evaluation: Pt planning to discharge to home with s/o and resumption of HC RN/PT/OT/HHA through [facility]

## 2024-09-05 NOTE — PROGRESS NOTES
[facility]    Medicine Progress Note - Hospitalist Service    Date of Admission:  9/3/2024    Assessment & Plan   Seth Mcdaniels is a 88 year old male with complicated medical history significant for chronic atrial fibrillation on DOAC, recent hospitalization and TCU discharge after a mechanical fall with complicated rectus sheath hematoma, recent hypoxic respiratory failure due to underlying COVID-19 infection and hospital-acquired pneumonia, KALEIGH, morbid obesity, benign essential hypertension, s/p PPM, chronic congestive heart failure with preserved EF, history of CVA, depression, anxiety, stable anemia, and suspected CKD with previous elevated creatinine who was sent to [facility] ED on 9/3/2024 from Urgent Care due to concerns for left leg redness and was noted of having soft blood pressure levels with concerns for underlying sepsis.  He mentioned that he was in his usual state of health until 1 day prior to this presentation when he noticed this increasing left leg redness and accompanied with weeping edema.     #SIRS versus early sepsis due to left LE cellulitis  #Chronic bilateral LE edema, likely mostly related to venous insufficiency   Presented with subjective chills, tachypnea, leukocytosis of 23.9, hypotension in outpatient clinic setting with BP of 87/52.   Started on abx for cellulitis. Leukocytosis resolved. Afebrile.   Pt notes onset of pain in left lower extremity this morning, resolved with low dose oxycodone  - continue IV Ancef   - elevate bilateral LE  - WOC consult for wound care   - received 500 ml NS bolus in ED, hold off on further IVFs with history of heart failure and monitor, encourage oral intake   - PRN Tylenol for pain, low dose oxycodone added as well  - continue PTA Lasix 40 mg daily  - PT consult for mobility assessment, typically ambulatory with a walker. Ambulate in hallway with staff  - anticipate discharge tomorrow if he remains stable    #Chronic A-fib on  "Xarelto  Rate controlled  -continue PTA Metoprolol with parameters and Xarelto     #Chronic congestive heart failure   Appears to be slightly hypervolemic. Previous baseline weight around 347 pounds at home and down to 352 pounds when discharging from TCU on 8/4. Appears his Lasix was increased from 40 to 60 mg of recent. Not currently hypoxic and denies shortness of breath.   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   - Cr 1.3 on admission, improved to 1.09 today  - monitor BMP closely with continuing increased dose of Lasix     #Depression, anxiety  -continue PTA Bupropion and Sertraline     #Morbid obesity  #KALEIGH  Complicates care, does not use CPAP  -PRN supplemental oxygen while sleeping     #Cognitive impairment  SLUMS of 24/30 during recent TCU stay. Deemed okay to live alone with weekly check ins.          Diet: Combination Diet Regular Diet Adult    DVT Prophylaxis: DOAC  Benedict Catheter: Not present  Lines: None     Cardiac Monitoring: None  Code Status: No CPR- Do NOT Intubate      Clinically Significant Risk Factors              # Hypoalbuminemia: Lowest albumin = 3.4 g/dL at 9/3/2024 12:17 PM, will monitor as appropriate   # Thrombocytopenia: Lowest platelets = 122 in last 2 days, will monitor for bleeding             # Severe Obesity: Estimated body mass index is 49.65 kg/m  as calculated from the following:    Height as of this encounter: 1.778 m (5' 10\").    Weight as of this " encounter: 156.9 kg (346 lb)., PRESENT ON ADMISSION                  Disposition Plan     Medically Ready for Discharge: Anticipated Tomorrow           The patient's care was discussed with the Bedside Nurse, Patient, and Patient's Family.    Kayleen Mcgregor PA-C  Hospitalist Service  [facility]  Securely message with [employer] (more info)  Text page via [employer] Paging/Directory   ______________________________________________________________________    Interval History   Noted new onset of L LE pain this AM, responded well to oxycodone. Denies SOB    Physical Exam   Vital Signs: Temp: 98  F (36.7  C) Temp src: Axillary BP: 123/58 Pulse: 73   Resp: 18 SpO2: 94 % O2 Device: None (Room air)    Weight: 346 lbs 0 oz    GENERAL:  Comfortable.  PSYCH: pleasant, oriented, No acute distress.  HEART:  RRR  LUNGS:  Normal Respiratory effort.   EXTREMITIES:  erythema and edema to L LE, skin indurated in area. Open area covered with mepilex  NEUROLOGIC:  grossly intact    Medical Decision Making       45 MINUTES SPENT BY ME on the date of service doing chart review, history, exam, documentation & further activities per the note.      Data     I have personally reviewed the following data over the past 24 hrs:    8.7  \   10.5 (L)   / 122 (L)     139 103 25.9 (H) /  101 (H)   4.0 25 1.09 \       Imaging results reviewed over the past 24 hrs:   No results found for this or any previous visit (from the past 24 hour(s)).

## 2024-09-05 NOTE — PROGRESS NOTES
09/05/24 1404   Appointment Info   Signing Clinician's Name / Credentials (PT) Junior Dumont DPT   Quick Adds   Quick Adds Edema Eval   Living Environment   Living Environment Comments Pt lives in home with spouse, full flight of stairs to bed/bathroom with B railings.   Self-Care   Equipment Currently Used at Home walker, rolling   Fall history within last six months yes   Number of times patient has fallen within last six months 1   General Information   Onset of Illness/Injury or Date of Surgery 09/03/24   Referring Physician Deshaun Tracey MD   Patient/Family Therapy Goals Statement (PT) To improve mobility   Pertinent History of Current Problem (include personal factors and/or comorbidities that impact the POC) Per H&P, pt is an  88 year old male with complicated medical history significant for chronic atrial fibrillation on DOAC, recent hospitalization and TCU discharge after a mechanical fall with complicated rectus sheath hematoma, recent hypoxic respiratory failure due to underlying COVID-19 infection and hospital-acquired pneumonia, KALE[place], morbid obesity, benign essential hypertension, s/p PPM, chronic congestive heart failure with preserved EF, history of CVA, depression, anxiety, stable anemia, and suspected CKD with previous elevated creatinine who was sent to [facility] ED on 9/3/2024 from Urgent Care due to concerns for left leg redness and was noted of having soft blood pressure levels with concerns for underlying sepsis.  He mentioned that he was in his usual state of health until 1 day prior to this presentation when he noticed this increasing left leg redness and accompanied with weeping edema.   Existing Precautions/Restrictions fall   Cognition   Affect/Mental Status (Cognition) WFL   Orientation Status (Cognition) oriented x 4   Follows Commands (Cognition) WFL   Memory Deficit (Cognition) minimal deficit;recall, recent events   Pain Assessment   Patient Currently in Pain Yes, see  Vital Sign flowsheet  (L shin)   Integumentary/Edema   Onset of Edema   (reports past few months)   Affected Body Part(s) Left LE;Right LE   Edema Etiology Chronic Venous Insfficiency;Infection   Edema Precautions Acute infection;Cardiac Edema/CHF   Edema Examination/Assessment   Skin Condition Pitting;Dryness;Hemosiderin deposits   Scar Yes   Ulcerations Yes   Radial Pulse Symmetrical   Dorsal Pedal Pulse Symmetrical   Stemmer Sign Positive   Pitting Assessment 2+ R LE ankle to knee; 4+ L LE dorsum to mid shin, 2+ shin to knee   Edema Assessment Comments pt poor historian with edema history, reports having been wrapped in past and use of compression stockings   Range of Motion (ROM)   ROM Comment B LE WFL   Strength (Manual Muscle Testing)   Strength Comments able to complete B SLR, decreased activity tolerance   Bed Mobility   Comment, (Bed Mobility) ind supine <> sit   Transfers   Comment, (Transfers) SBA sit <> Stand with FWW   Gait/Stairs (Locomotion)   Distance in Feet (Gait) 20   Pattern (Gait) step-through   Deviations/Abnormal Patterns (Gait) base of support, wide;gait speed decreased;stride length decreased   Comment, (Gait/Stairs) SBA with FWW   Balance   Balance Comments good sitting; good- standing with FWW   Clinical Impression   Criteria for Skilled Therapeutic Intervention Yes, treatment indicated   PT Diagnosis (PT) decreased functional mobility   Edema: Patient Presentation Stage 2 Lymphedema   Influenced by the following impairments decreased balance, decreased activity tolerance   Functional limitations due to impairments impaired ambulation, stairs   Clinical Presentation (PT Evaluation Complexity) stable   Clinical Presentation Rationale Pt functional status   Clinical Decision Making (Complexity) low complexity   Planned Therapy Interventions (PT) gait training;home exercise program;patient/family education;postural re-education;strengthening;stair training;transfer training;progressive  activity/exercise;risk factor education   Edema: Planned Interventions Manual lymph drainage;Gradient compression bandaging;Fit for compression garment;Edema exercises;Precautions to prevent infection/exacerbation;Education;Manual therapy;ADL training   Risk & Benefits of therapy have been explained evaluation/treatment results reviewed;care plan/treatment goals reviewed;risks/benefits reviewed;current/potential barriers reviewed;participants voiced agreement with care plan;participants included;patient   PT Total Evaluation Time   PT Eval, Low Complexity Minutes (85274) 15   Physical Therapy Goals   PT Frequency Daily   PT Predicted Duration/Target Date for Goal Attainment 09/06/24   PT Goals Transfers;Gait;Stairs   PT: Transfers Sit to/from stand;Assistive device;Modified independent   PT: Gait Supervision/stand-by assist;Assistive device;Greater than 200 feet   PT: Stairs Supervision/stand-by assist;10 stairs;Rail on both sides   Interventions   Interventions Quick Adds Therapeutic Activity;Manual Therapy   Therapeutic Activity   Therapeutic Activities: dynamic activities to improve functional performance Minutes (81422) 18   Symptoms Noted During/After Treatment Shortness of breath   Treatment Detail/Skilled Intervention Instructed pt in sit <> stand transfers x4 with SBA/mod ind, appropriate UE placement for stability and able to complete toilet transfer without LOB. VC for upright posture as able. Pt tolerated ~240 ft ambulation, able to navigate x1 flight of stairs with SBA, step to patterning with no LOB.   Manual Therapy   Manual Therapy Minutes (76354) 18   Symptoms Noted During/After Treatment None   Treatment Detail/Skilled Intervention Lymphedema evaluation complete and treatment initiated. Pt with dressing on L anterior shin. Educated in use of compression to assist with lymphedema management.  B LEs washed, lotion applied, size L TG sofe and 8cm/12cm quick wraps donned to B LE, with appropriate  stretch and spacing to allow gradient compression. Discussed with patient s/s of intolerance and to alert nursing staff.   PT Discharge Planning   PT Plan reassess LE edema   PT Discharge Recommendation (DC Rec) home with home care physical therapy  (lymphedema)   PT Rationale for DC Rec Pt mobilizing well,SBA/mod ind with all activity during session this date. Pt would benefit from continued f/u with HC PT, lymphedema services.   PT Brief overview of current status SBA with FWW   Total Session Time   Timed Code Treatment Minutes 36   Total Session Time (sum of timed and untimed services) 51

## 2024-09-05 NOTE — PLAN OF CARE
"23:00-07:30    Alert and oriented. Denies any pain or discomfort. Dyspnea on exertion noted. Refused to elevate  BLE overnight. Scheduled IV Cefazolin given. Plan PT consult.    Goal Outcome Evaluation:      Plan of Care Reviewed With: patient    Overall Patient Progress: improvingOverall Patient Progress: improving    Outcome Evaluation: denies any pain      Problem: Adult Inpatient Plan of Care  Goal: Plan of Care Review  Description: The Plan of Care Review/Shift note should be completed every shift.  The Outcome Evaluation is a brief statement about your assessment that the patient is improving, declining, or no change.  This information will be displayed automatically on your shift  note.  Outcome: Progressing  Flowsheets (Taken 9/5/2024 0609)  Outcome Evaluation: denies any pain  Plan of Care Reviewed With: patient  Overall Patient Progress: improving  Goal: Patient-Specific Goal (Individualized)  Description: You can add care plan individualizations to a care plan. Examples of Individualization might be:  \"Parent requests to be called daily at 9am for status\", \"I have a hard time hearing out of my right ear\", or \"Do not touch me to wake me up as it startles  me\".  Outcome: Progressing  Goal: Absence of Hospital-Acquired Illness or Injury  Outcome: Progressing  Intervention: Identify and Manage Fall Risk  Recent Flowsheet Documentation  Taken 9/5/2024 0114 by Peggy Anne RN  Safety Promotion/Fall Prevention: activity supervised  Intervention: Prevent Skin Injury  Recent Flowsheet Documentation  Taken 9/5/2024 0114 by Peggy Anne RN  Skin Protection: adhesive use limited  Device Skin Pressure Protection: absorbent pad utilized/changed  Intervention: Prevent and Manage VTE (Venous Thromboembolism) Risk  Recent Flowsheet Documentation  Taken 9/5/2024 0114 by Peggy Anne RN  VTE Prevention/Management: SCDs off (sequential compression devices)  Intervention: Prevent Infection  Recent Flowsheet " Documentation  Taken 9/5/2024 0114 by Peggy Anne RN  Infection Prevention:   hand hygiene promoted   rest/sleep promoted  Goal: Optimal Comfort and Wellbeing  Outcome: Progressing  Goal: Readiness for Transition of Care  Outcome: Progressing     Problem: Fall Injury Risk  Goal: Absence of Fall and Fall-Related Injury  Outcome: Progressing  Intervention: Identify and Manage Contributors  Recent Flowsheet Documentation  Taken 9/5/2024 0114 by Peggy Anne RN  Medication Review/Management: medications reviewed  Intervention: Promote Injury-Free Environment  Recent Flowsheet Documentation  Taken 9/5/2024 0114 by Peggy Anne RN  Safety Promotion/Fall Prevention: activity supervised     Problem: Pain Acute  Goal: Optimal Pain Control and Function  Outcome: Progressing  Intervention: Prevent or Manage Pain  Recent Flowsheet Documentation  Taken 9/5/2024 0114 by Peggy Anne RN  Medication Review/Management: medications reviewed

## 2024-09-05 NOTE — CONSULTS
Care Management Initial Consult    General Information  Assessment completed with: Patient,    Type of CM/SW Visit: Initial Assessment    Primary Care Provider verified and updated as needed: Yes   Readmission within the last 30 days: no previous admission in last 30 days      Reason for Consult: discharge planning  Advance Care Planning: Advance Care Planning Reviewed: no concerns identified        Communication Assessment  Patient's communication style: spoken language (English or Bilingual)    Hearing Difficulty or Deaf: yes   Wear Glasses or Blind: yes    Cognitive  Cognitive/Neuro/Behavioral: WDL                      Living Environment:   People in home: significant other     Current living Arrangements: house      Able to return to prior arrangements: yes     Family/Social Support:  Care provided by: self, spouse/significant other  Provides care for: no one  Marital Status: Lives with Significant Other  Support system: Significant Other, Children          Description of Support System: Supportive, Involved    Support Assessment: Adequate family and caregiver support    Current Resources:   Patient receiving home care services: Yes  Skilled Home Care Services: Skilled Nursing, Home Health Aid, Physical Therapy, Occupational Therapy  Community Resources: Home Care  Equipment currently used at home: walker, rolling  Supplies currently used at home: None    Does the patient's insurance plan have a 3 day qualifying hospital stay waiver?  Yes     Which insurance plan 3 day waiver is available? Alternative insurance waiver    Will the waiver be used for post-acute placement? Undetermined at this time    Lifestyle & Psychosocial Needs:  Social Determinants of Health     Food Insecurity: Low Risk  (9/3/2024)    Food Insecurity     Within the past 12 months, did you worry that your food would run out before you got money to buy more?: No     Within the past 12 months, did the food you bought just not last and you didn t  have money to get more?: No   Depression: Not at risk (6/17/2024)    Received from [facility]    PHQ-2     PHQ-2 TOTAL SCORE: 2   Housing Stability: Low Risk  (9/3/2024)    Housing Stability     Do you have housing? : Yes     Are you worried about losing your housing?: No   Tobacco Use: Medium Risk (9/3/2024)    Received from [facility]    Patient History     Smoking Tobacco Use: Never     Smokeless Tobacco Use: Former     Passive Exposure: Not on file   Financial Resource Strain: Low Risk  (9/3/2024)    Financial Resource Strain     Within the past 12 months, have you or your family members you live with been unable to get utilities (heat, electricity) when it was really needed?: No   Alcohol Use: Not on file   Transportation Needs: Low Risk  (9/3/2024)    Transportation Needs     Within the past 12 months, has lack of transportation kept you from medical appointments, getting your medicines, non-medical meetings or appointments, work, or from getting things that you need?: No   Physical Activity: Not on file   Interpersonal Safety: Low Risk  (9/3/2024)    Interpersonal Safety     Do you feel physically and emotionally safe where you currently live?: Yes     Within the past 12 months, have you been hit, slapped, kicked or otherwise physically hurt by someone?: No     Within the past 12 months, have you been humiliated or emotionally abused in other ways by your partner or ex-partner?: No   Stress: Not on file   Social Connections: Socially Integrated (4/30/2024)    Received from [facility]    Social Connections     Frequency of Communication with Friends and Family: 0   Health Literacy: Not on file       Functional Status:  Prior to admission patient needed assistance:   Dependent ADLs:: Ambulation-walker, Ambulation-cane, Bathing  Dependent IADLs:: Cleaning, Shopping, Medication Management     Additional  Information:  CM consulted for discharge planning, met with pt at bedside to discuss. Pt lives in a home with his significant other and is independent with a cane/WW at baseline. He was recently at [facility] from 7/27-8/5 and then discharged to home with HC. He is open to [facility] for RN/PT/OT/HHA. He is hopeful to return home with resumption of HC at discharge. PT evaluation pending at this time. Family to transport at discharge.     Next Steps: Follow along for PT recommendations.     Elva Lloyd RN BSN   Inpatient Care Coordination  [facility]   Phone [phone]

## 2024-09-06 ENCOUNTER — APPOINTMENT (OUTPATIENT)
Dept: PHYSICAL THERAPY | Facility: CLINIC | Age: 89
DRG: 872 | End: 2024-09-06
Payer: COMMERCIAL

## 2024-09-06 VITALS
RESPIRATION RATE: 18 BRPM | WEIGHT: 315 LBS | HEIGHT: 70 IN | HEART RATE: 62 BPM | OXYGEN SATURATION: 94 % | DIASTOLIC BLOOD PRESSURE: 66 MMHG | TEMPERATURE: 97.7 F | BODY MASS INDEX: 45.1 KG/M2 | SYSTOLIC BLOOD PRESSURE: 133 MMHG

## 2024-09-06 LAB
ANION GAP SERPL CALCULATED.3IONS-SCNC: 10 MMOL/L (ref 7–15)
BASOPHILS # BLD AUTO: 0 10E3/UL (ref 0–0.2)
BASOPHILS NFR BLD AUTO: 0 %
BUN SERPL-MCNC: 27.1 MG/DL (ref 8–23)
CALCIUM SERPL-MCNC: 8.5 MG/DL (ref 8.8–10.4)
CHLORIDE SERPL-SCNC: 104 MMOL/L (ref 98–107)
CREAT SERPL-MCNC: 0.97 MG/DL (ref 0.67–1.17)
EGFRCR SERPLBLD CKD-EPI 2021: 75 ML/MIN/1.73M2
EOSINOPHIL # BLD AUTO: 0.1 10E3/UL (ref 0–0.7)
EOSINOPHIL NFR BLD AUTO: 2 %
ERYTHROCYTE [DISTWIDTH] IN BLOOD BY AUTOMATED COUNT: 13.2 % (ref 10–15)
GLUCOSE SERPL-MCNC: 100 MG/DL (ref 70–99)
HCO3 SERPL-SCNC: 26 MMOL/L (ref 22–29)
HCT VFR BLD AUTO: 31.5 % (ref 40–53)
HGB BLD-MCNC: 10.1 G/DL (ref 13.3–17.7)
IMM GRANULOCYTES # BLD: 0 10E3/UL
IMM GRANULOCYTES NFR BLD: 0 %
LYMPHOCYTES # BLD AUTO: 1 10E3/UL (ref 0.8–5.3)
LYMPHOCYTES NFR BLD AUTO: 15 %
MCH RBC QN AUTO: 32.3 PG (ref 26.5–33)
MCHC RBC AUTO-ENTMCNC: 32.1 G/DL (ref 31.5–36.5)
MCV RBC AUTO: 101 FL (ref 78–100)
MONOCYTES # BLD AUTO: 0.8 10E3/UL (ref 0–1.3)
MONOCYTES NFR BLD AUTO: 12 %
NEUTROPHILS # BLD AUTO: 4.9 10E3/UL (ref 1.6–8.3)
NEUTROPHILS NFR BLD AUTO: 72 %
NRBC # BLD AUTO: 0 10E3/UL
NRBC BLD AUTO-RTO: 0 /100
PLATELET # BLD AUTO: 134 10E3/UL (ref 150–450)
POTASSIUM SERPL-SCNC: 4 MMOL/L (ref 3.4–5.3)
RBC # BLD AUTO: 3.13 10E6/UL (ref 4.4–5.9)
SODIUM SERPL-SCNC: 140 MMOL/L (ref 135–145)
WBC # BLD AUTO: 6.8 10E3/UL (ref 4–11)

## 2024-09-06 PROCEDURE — 250N000011 HC RX IP 250 OP 636: Mod: JZ | Performed by: INTERNAL MEDICINE

## 2024-09-06 PROCEDURE — 97140 MANUAL THERAPY 1/> REGIONS: CPT | Mod: GP | Performed by: PHYSICAL THERAPIST

## 2024-09-06 PROCEDURE — 250N000013 HC RX MED GY IP 250 OP 250 PS 637: Performed by: INTERNAL MEDICINE

## 2024-09-06 PROCEDURE — 85025 COMPLETE CBC W/AUTO DIFF WBC: CPT | Performed by: PHYSICIAN ASSISTANT

## 2024-09-06 PROCEDURE — 250N000013 HC RX MED GY IP 250 OP 250 PS 637: Performed by: PHYSICIAN ASSISTANT

## 2024-09-06 PROCEDURE — 80048 BASIC METABOLIC PNL TOTAL CA: CPT | Performed by: PHYSICIAN ASSISTANT

## 2024-09-06 PROCEDURE — 36415 COLL VENOUS BLD VENIPUNCTURE: CPT | Performed by: PHYSICIAN ASSISTANT

## 2024-09-06 PROCEDURE — 99239 HOSP IP/OBS DSCHRG MGMT >30: CPT | Performed by: PHYSICIAN ASSISTANT

## 2024-09-06 RX ORDER — FUROSEMIDE 20 MG
60 TABLET ORAL DAILY
COMMUNITY
Start: 2024-09-06

## 2024-09-06 RX ORDER — CEPHALEXIN 500 MG/1
500 CAPSULE ORAL 2 TIMES DAILY
Qty: 14 CAPSULE | Refills: 0 | Status: SHIPPED | OUTPATIENT
Start: 2024-09-06 | End: 2024-09-13

## 2024-09-06 RX ADMIN — ASPIRIN 81 MG: 81 TABLET, COATED ORAL at 08:29

## 2024-09-06 RX ADMIN — FERROUS SULFATE TAB 325 MG (65 MG ELEMENTAL FE) 325 MG: 325 (65 FE) TAB at 08:29

## 2024-09-06 RX ADMIN — DICLOFENAC SODIUM 2 G: 10 GEL TOPICAL at 08:29

## 2024-09-06 RX ADMIN — METOPROLOL SUCCINATE 25 MG: 25 TABLET, EXTENDED RELEASE ORAL at 08:29

## 2024-09-06 RX ADMIN — CEFAZOLIN SODIUM 2 G: 2 INJECTION, SOLUTION INTRAVENOUS at 08:44

## 2024-09-06 RX ADMIN — BUPROPION HYDROCHLORIDE 150 MG: 150 TABLET, EXTENDED RELEASE ORAL at 08:29

## 2024-09-06 RX ADMIN — CEFAZOLIN SODIUM 2 G: 2 INJECTION, SOLUTION INTRAVENOUS at 00:47

## 2024-09-06 RX ADMIN — FUROSEMIDE 60 MG: 40 TABLET ORAL at 08:28

## 2024-09-06 RX ADMIN — SERTRALINE 100 MG: 100 TABLET, FILM COATED ORAL at 08:29

## 2024-09-06 RX ADMIN — Medication 1 TABLET: at 08:29

## 2024-09-06 ASSESSMENT — ACTIVITIES OF DAILY LIVING (ADL)
ADLS_ACUITY_SCORE: 31
ADLS_ACUITY_SCORE: 31
ADLS_ACUITY_SCORE: 35
ADLS_ACUITY_SCORE: 31
ADLS_ACUITY_SCORE: 35
ADLS_ACUITY_SCORE: 31

## 2024-09-06 NOTE — PLAN OF CARE
"Patient's After Visit Summary was reviewed with patient and/or family.   Patient verbalized understanding of After Visit Summary, recommended follow up and was given an opportunity to ask questions.   Discharge medications sent home with patient/family: YES - Keflex  Discharged with significant other    OBSERVATION patient END time: 1436     Goal Outcome Evaluation:      Plan of Care Reviewed With: patient    Overall Patient Progress: improvingOverall Patient Progress: improving    Outcome Evaluation: Discharging      Problem: Adult Inpatient Plan of Care  Goal: Plan of Care Review  Description: The Plan of Care Review/Shift note should be completed every shift.  The Outcome Evaluation is a brief statement about your assessment that the patient is improving, declining, or no change.  This information will be displayed automatically on your shift  note.  Outcome: Progressing  Flowsheets (Taken 9/6/2024 1126)  Outcome Evaluation: Feeling better today, Denies pain. BLE wrapped for lymphedema. Up as SBA. Hoping to discharge home today  Plan of Care Reviewed With: patient  Overall Patient Progress: improving  Goal: Patient-Specific Goal (Individualized)  Description: You can add care plan individualizations to a care plan. Examples of Individualization might be:  \"Parent requests to be called daily at 9am for status\", \"I have a hard time hearing out of my right ear\", or \"Do not touch me to wake me up as it startles  me\".  9/6/2024 1438 by Swapna Block, RN  Outcome: Met  9/6/2024 1126 by Swapna Block, RN  Outcome: Progressing  Goal: Absence of Hospital-Acquired Illness or Injury  9/6/2024 1438 by Swapna Block, RN  Outcome: Met  9/6/2024 1126 by Swapna Block, RN  Outcome: Progressing  Intervention: Prevent Infection  Recent Flowsheet Documentation  Taken 9/6/2024 0921 by Swapna Block, RN  Infection Prevention:   rest/sleep promoted   hand hygiene promoted   equipment surfaces " disinfected   single patient room provided  Goal: Optimal Comfort and Wellbeing  9/6/2024 1438 by Swapna Block, RN  Outcome: Met  9/6/2024 1126 by Swapna Block, RN  Outcome: Progressing  Goal: Readiness for Transition of Care  9/6/2024 1438 by Swapna Block, RN  Outcome: Met  9/6/2024 1126 by Swapna Block, RN  Outcome: Progressing     Problem: Fall Injury Risk  Goal: Absence of Fall and Fall-Related Injury  9/6/2024 1438 by Swapna Block, RN  Outcome: Met  9/6/2024 1126 by Swapna Block, RN  Outcome: Progressing     Problem: Pain Acute  Goal: Optimal Pain Control and Function  9/6/2024 1438 by Swapna Block, RN  Outcome: Met  9/6/2024 1126 by Swapna Block, RN  Outcome: Progressing

## 2024-09-06 NOTE — DISCHARGE SUMMARY
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    Other medical issues:  Past Medical History:   Diagnosis Date    Depressive disorder, not elsewhere classified     History of tension headache     Hypertension     Obesity, unspecified     Obstructive sleep apnea (adult) (pediatric)     Osteoarthritis     Unspecified cerebral artery occlusion with cerebral infarction     no residual     History of Present Illness   Seth Mcdaniels is a 88 year old male with complicated medical history significant for chronic atrial fibrillation on DOAC, recent hospitalization and TCU discharge after a mechanical fall with complicated rectus sheath hematoma, recent hypoxic respiratory failure due to underlying COVID-19 infection and hospital-acquired pneumonia, KALEIGH, morbid obesity, benign essential hypertension, s/p PPM, chronic congestive heart failure with preserved EF, history of CVA, depression, anxiety, stable anemia, and suspected CKD with previous elevated creatinine who was sent to [facility] ED on 9/3/2024 from Urgent Care due to concerns for left leg redness and was noted of having soft blood pressure levels with concerns for underlying sepsis. He mentioned that he was in his usual state of health until 1 day prior to this presentation when he noticed this increasing left leg redness and accompanied with weeping edema. Please see the admission history and physical for full details.    Hospital Course   Seth Mcdaniels was admitted on 9/3/2024.  The following problems were addressed during his hospitalization:    #SIRS versus early sepsis due to left LE cellulitis  #Chronic bilateral LE edema,  likely mostly related to venous insufficiency   Presented with subjective chills, tachypnea, leukocytosis of 23.9, hypotension in outpatient clinic setting with BP of 87/52.   Started on abx for cellulitis. Leukocytosis resolved. Afebrile.   Pt notes onset of pain in left lower extremity the morning of 9/5, resolved with low dose oxycodone  - received IV Ancef while admitted, will discharge on PO Keflex to complete antibiotic course  - elevate bilateral LE when resting   - WOC consult for wound care, continue at home   - no pain prior to discharge   - continue PTA Lasix at increased dose of 60 mg  - no PT needs while admitted due to being SBA, recommend outpatient PT for lymphedema services      #Chronic A-fib on Xarelto  Rate controlled  -continue PTA Metoprolol with parameters and Xarelto      #Chronic congestive heart failure   Appears to be slightly hypervolemic on admission. Previous baseline weight around 347 pounds at home and down to 352 pounds when discharging from TCU on 8/4. Appears his Lasix was increased from 40 to 60 mg of recent. Not currently hypoxic and denies shortness of breath.   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   - Cr 1.3 on admission with improvement down to 0.97 prior to discharge     #Depression, anxiety  -continue PTA Bupropion and Sertraline      #Morbid obesity  #KALEIGH  Complicates care, does not use  "CPAP     #Cognitive impairment  SLUMS of 24/30 during recent TCU stay. Deemed okay to live alone with weekly check ins.    Pending Results   Unresulted Labs Ordered in the Past 30 Days of this Admission       Date and Time Order Name Status Description    9/3/2024 12:47 PM Blood Culture Hand, Right Preliminary     9/3/2024 12:47 PM Blood Culture Arm, Right Preliminary           Code Status   DNR / DNI       Primary Care Physician   Andrew Stanley    Exam:    /66 (BP Location: Left arm)   Pulse 62   Temp 97.7  F (36.5  C) (Oral)   Resp 18   Ht 1.778 m (5' 10\")   Wt (!) 157.7 kg (347 lb 11.2 oz)   SpO2 94%   BMI 49.89 kg/m    General Appearance:  Laying in bed, pleasant, NAD  Respiratory: CTAB without wheezing or rales   Cardiovascular: irregularly irregular rhythm with normal rate, no murmur appreciated   GI: soft, nontender, nondistended, normoactive bowel sounds   Skin: warm, Left LE: from knee down there are chronic venous stasis changes with decreased bright erythema present and associated swelling, no longer warm, bilateral LEs with 1-2+ pitting edema (left > right), left lower leg without tenderness     Discharge Disposition   Discharged to home    Consultations This Hospital Stay   PHYSICAL THERAPY ADULT IP CONSULT  CARE MANAGEMENT / SOCIAL WORK IP CONSULT  WOUND OSTOMY CONTINENCE NURSE  IP CONSULT  SPIRITUAL HEALTH SERVICES IP CONSULT  LYMPHEDEMA THERAPY IP CONSULT    Time Spent on this Encounter   I, Tavia Burgess PA-C, personally saw the patient today and spent greater than 30 minutes discharging this patient.    Discharge Orders      Lymphedema Therapy  Referral      Home Care Referral      Reason for your hospital stay    You were admitted due to concerns for cellulitis of left lower leg in relation to chronic bilateral lower extremity swelling and lymphedema and venous insufficiency.  You were started on IV antibiotics with improvement in your white blood cell count, vital " signs, and redness/swelling of lower legs.  You were seen by the wound nurse for a venous stasis ulcer and should continue wound care upon discharge.  You will transition to oral antibiotics at discharge for total of a 10-day course.  You are seen by physical therapy during your stay and felt you are safe to discharge home with home therapy as well as lymphedema care.     Follow-up and recommended labs and tests     Follow up with primary care provider, Andrew Stanley, within 10-14 days for hospital follow- up.  The following labs/tests are recommended: Hgb to ensure stability and upward trend.     Activity    Your activity upon discharge: activity as tolerated     Discharge Instructions    Elevate bilateral LE to assist with swelling     Wound care and dressings    Instructions to care for your wound at home:   Left shin wound(s): Every 3 days  1. Cleanse with wound cleanser and dry  2. Apply Mepilex 4x4     Monitor and record    Weight: every day and keep log, contact primary care provider if weight gain of 2 pounds in one day or 5 pounds in a week.     Diet    Follow this diet upon discharge: Current Diet:Orders Placed This Encounter      Combination Diet Regular Diet Adult     Discharge Medications   Current Discharge Medication List        START taking these medications    Details   cephALEXin (KEFLEX) 500 MG capsule Take 1 capsule (500 mg) by mouth 2 times daily for 7 days.  Qty: 14 capsule, Refills: 0    Associated Diagnoses: Cellulitis of left lower leg           CONTINUE these medications which have CHANGED    Details   furosemide (LASIX) 20 MG tablet Take 3 tablets (60 mg) by mouth daily.           CONTINUE these medications which have NOT CHANGED    Details   acetaminophen (TYLENOL) 325 MG tablet Take 650 mg by mouth every 4 hours as needed for mild pain      Apoaequorin (PREVAGEN PO) Take 1 capsule by mouth daily.      aspirin (ASA) 81 MG EC tablet Take 1 tablet (81 mg) by mouth daily  Qty:  ,  Refills: 0    Associated Diagnoses: Coronary artery disease involving native heart without angina pectoris, unspecified vessel or lesion type      atorvastatin (LIPITOR) 40 MG tablet Take 40 mg by mouth every evening      buPROPion (WELLBUTRIN XL) 150 MG 24 hr tablet Take 150 mg by mouth every morning for anxiousness associated with  depression.      carboxymethylcellulose (REFRESH PLUS) 0.5 % SOLN ophthalmic solution Place 2 drops into both eyes every 2 hours as needed for dry eyes      clobetasol (TEMOVATE) 0.05 % external ointment Apply topically 2 times daily  Apply  to BLE topically two times a day for Rash and  nonspecific skin eruption. Tinea, pedis for 2 Weeks  Lower legs until follow up in 2 weeks.      diclofenac (VOLTAREN) 1 % topical gel Apply 2 g topically 2 times daily.      ferrous sulfate (FEROSUL) 325 (65 Fe) MG tablet Take 325 mg by mouth daily (with breakfast)      Flaxseed, Linseed, (FLAXSEED OIL) 1000 MG CAPS Take 1,000 mg by mouth daily      ketoconazole (NIZORAL) 2 % external cream Apply topically 2 times daily Apply to Left upper  arm topically two times a day for skin infection due to  the fungus candida also apply to interdigital spaces      metoprolol succinate ER (TOPROL XL) 25 MG 24 hr tablet Take 25 mg by mouth every morning htn      Multiple Vitamins-Minerals (PRESERVISION AREDS 2 PO) Take 1 tablet by mouth 2 times daily      mupirocin (BACTROBAN) 2 % external ointment Apply topically daily.      nitroGLYcerin (NITROSTAT) 0.4 MG sublingual tablet Place 0.4 mg under the tongue every 5 minutes as needed for chest pain      polyethylene glycol (MIRALAX) 17 g packet Take 17 g by mouth daily as needed. constipation      rivaroxaban ANTICOAGULANT (XARELTO) 20 MG TABS tablet Take 1 tablet (20 mg) by mouth daily (with dinner)  Qty: 30 tablet, Refills: 0    Associated Diagnoses: Atrial fibrillation (H); Stroke (H)      senna-docusate (SENOKOT-S/PERICOLACE) 8.6-50 MG tablet Take 1 tablet by  mouth 2 times daily as needed for constipation      SERTRALINE HCL PO Take 100 mg by mouth every morning       triamcinolone (KENALOG) 0.1 % external cream 2 times daily.           STOP taking these medications       ipratropium - albuterol 0.5 mg/2.5 mg/3 mL (DUONEB) 0.5-2.5 (3) MG/3ML neb solution Comments:   Reason for Stopping:             Allergies   Allergies   Allergen Reactions    Lisinopril Cough     Data   Results for orders placed or performed during the hospital encounter of 09/03/24   Basic metabolic panel (BMP)     Status: Abnormal   Result Value Ref Range    Sodium 136 135 - 145 mmol/L    Potassium 4.4 3.4 - 5.3 mmol/L    Chloride 101 98 - 107 mmol/L    Carbon Dioxide (CO2) 23 22 - 29 mmol/L    Anion Gap 12 7 - 15 mmol/L    Urea Nitrogen 22.6 8.0 - 23.0 mg/dL    Creatinine 1.30 (H) 0.67 - 1.17 mg/dL    GFR Estimate 53 (L) >60 mL/min/1.73m2    Calcium 8.8 8.8 - 10.4 mg/dL    Glucose 112 (H) 70 - 99 mg/dL   Extra Tube ([place] Draw)     Status: None    Narrative    The following orders were created for panel order Extra Tube ([place] Draw).  Procedure                               Abnormality         Status                     ---------                               -----------         ------                     Extra Blue Top Tube[[id number]]                              Final result               Extra Red Top Tube[[id number]]                                                            Please view results for these tests on the individual orders.   CBC with platelets and differential     Status: Abnormal   Result Value Ref Range    WBC Count 23.9 (H) 4.0 - 11.0 10e3/uL    RBC Count 3.58 (L) 4.40 - 5.90 10e6/uL    Hemoglobin 11.5 (L) 13.3 - 17.7 g/dL    Hematocrit 35.5 (L) 40.0 - 53.0 %    MCV 99 78 - 100 fL    MCH 32.1 26.5 - 33.0 pg    MCHC 32.4 31.5 - 36.5 g/dL    RDW 13.8 10.0 - 15.0 %    Platelet Count 144 (L) 150 - 450 10e3/uL    % Neutrophils 78 %    % Lymphocytes 5 %    % Monocytes 17 %    %  Eosinophils 0 %    % Basophils 0 %    % Immature Granulocytes 1 %    NRBCs per 100 WBC 0 <1 /100    Absolute Neutrophils 18.7 (H) 1.6 - 8.3 10e3/uL    Absolute Lymphocytes 1.1 0.8 - 5.3 10e3/uL    Absolute Monocytes 4.0 (H) 0.0 - 1.3 10e3/uL    Absolute Eosinophils 0.0 0.0 - 0.7 10e3/uL    Absolute Basophils 0.0 0.0 - 0.2 10e3/uL    Absolute Immature Granulocytes 0.2 <=0.4 10e3/uL    Absolute NRBCs 0.0 10e3/uL   Extra Blue Top Tube     Status: None   Result Value Ref Range    Hold Specimen JIC    Manual Differential     Status: Abnormal   Result Value Ref Range    % Neutrophils 78 %    % Lymphocytes 4 %    % Monocytes 18 %    % Eosinophils 0 %    % Basophils 0 %    Absolute Neutrophils 18.6 (H) 1.6 - 8.3 10e3/uL    Absolute Lymphocytes 1.0 0.8 - 5.3 10e3/uL    Absolute Monocytes 4.3 (H) 0.0 - 1.3 10e3/uL    Absolute Eosinophils 0.0 0.0 - 0.7 10e3/uL    Absolute Basophils 0.0 0.0 - 0.2 10e3/uL    RBC Morphology Confirmed RBC Indices     Platelet Assessment  Automated Count Confirmed. Platelet morphology is normal.     Automated Count Confirmed. Platelet morphology is normal.    Pathologist Review Comments (Blood)      Narrative    Sent for Review by Pathologist. Review comments will be entered. Results will be updated after review as applicable.     Lactic acid whole blood with 1x repeat in 2 hr when >2     Status: Normal   Result Value Ref Range    Lactic Acid, Initial 1.7 0.7 - 2.0 mmol/L   Comprehensive metabolic panel     Status: Abnormal   Result Value Ref Range    Sodium 133 (L) 135 - 145 mmol/L    Potassium 4.3 3.4 - 5.3 mmol/L    Carbon Dioxide (CO2) 21 (L) 22 - 29 mmol/L    Anion Gap 13 7 - 15 mmol/L    Urea Nitrogen 23.3 (H) 8.0 - 23.0 mg/dL    Creatinine 1.28 (H) 0.67 - 1.17 mg/dL    GFR Estimate 54 (L) >60 mL/min/1.73m2    Calcium 8.5 (L) 8.8 - 10.4 mg/dL    Chloride 99 98 - 107 mmol/L    Glucose 100 (H) 70 - 99 mg/dL    Alkaline Phosphatase 71 40 - 150 U/L    AST 18 0 - 45 U/L    ALT 11 0 - 70 U/L     Protein Total 6.1 (L) 6.4 - 8.3 g/dL    Albumin 3.4 (L) 3.5 - 5.2 g/dL    Bilirubin Total 0.9 <=1.2 mg/dL   iStat Gases (lactate) venous, POCT     Status: Abnormal   Result Value Ref Range    Lactic Acid POCT 1.5 <=2.0 mmol/L    Bicarbonate Venous POCT 27 21 - 28 mmol/L    O2 Sat, Venous POCT 60 (L) 70 - 75 %    pCO2 Venous POCT 44 40 - 50 mm Hg    pH Venous POCT 7.40 7.32 - 7.43    pO2 Venous POCT 32 25 - 47 mm Hg   Basic metabolic panel     Status: Abnormal   Result Value Ref Range    Sodium 137 135 - 145 mmol/L    Potassium 4.0 3.4 - 5.3 mmol/L    Chloride 101 98 - 107 mmol/L    Carbon Dioxide (CO2) 27 22 - 29 mmol/L    Anion Gap 9 7 - 15 mmol/L    Urea Nitrogen 25.4 (H) 8.0 - 23.0 mg/dL    Creatinine 1.31 (H) 0.67 - 1.17 mg/dL    GFR Estimate 52 (L) >60 mL/min/1.73m2    Calcium 8.4 (L) 8.8 - 10.4 mg/dL    Glucose 106 (H) 70 - 99 mg/dL   CBC with platelets and differential     Status: Abnormal   Result Value Ref Range    WBC Count 13.8 (H) 4.0 - 11.0 10e3/uL    RBC Count 3.39 (L) 4.40 - 5.90 10e6/uL    Hemoglobin 10.9 (L) 13.3 - 17.7 g/dL    Hematocrit 34.0 (L) 40.0 - 53.0 %     78 - 100 fL    MCH 32.2 26.5 - 33.0 pg    MCHC 32.1 31.5 - 36.5 g/dL    RDW 13.7 10.0 - 15.0 %    Platelet Count 143 (L) 150 - 450 10e3/uL    NRBCs per 100 WBC 0 <1 /100    Absolute NRBCs 0.0 10e3/uL   Manual Differential     Status: Abnormal   Result Value Ref Range    % Neutrophils 89 %    % Lymphocytes 5 %    % Monocytes 6 %    % Eosinophils 0 %    % Basophils 0 %    Absolute Neutrophils 12.3 (H) 1.6 - 8.3 10e3/uL    Absolute Lymphocytes 0.7 (L) 0.8 - 5.3 10e3/uL    Absolute Monocytes 0.8 0.0 - 1.3 10e3/uL    Absolute Eosinophils 0.0 0.0 - 0.7 10e3/uL    Absolute Basophils 0.0 0.0 - 0.2 10e3/uL    RBC Morphology Confirmed RBC Indices     Platelet Assessment  Automated Count Confirmed. Platelet morphology is normal.     Automated Count Confirmed. Platelet morphology is normal.    Smudge Cells Present (A) None Seen   Basic  metabolic panel     Status: Abnormal   Result Value Ref Range    Sodium 139 135 - 145 mmol/L    Potassium 4.0 3.4 - 5.3 mmol/L    Chloride 103 98 - 107 mmol/L    Carbon Dioxide (CO2) 25 22 - 29 mmol/L    Anion Gap 11 7 - 15 mmol/L    Urea Nitrogen 25.9 (H) 8.0 - 23.0 mg/dL    Creatinine 1.09 0.67 - 1.17 mg/dL    GFR Estimate 65 >60 mL/min/1.73m2    Calcium 8.5 (L) 8.8 - 10.4 mg/dL    Glucose 101 (H) 70 - 99 mg/dL   CBC with platelets and differential     Status: Abnormal   Result Value Ref Range    WBC Count 8.7 4.0 - 11.0 10e3/uL    RBC Count 3.23 (L) 4.40 - 5.90 10e6/uL    Hemoglobin 10.5 (L) 13.3 - 17.7 g/dL    Hematocrit 32.3 (L) 40.0 - 53.0 %     78 - 100 fL    MCH 32.5 26.5 - 33.0 pg    MCHC 32.5 31.5 - 36.5 g/dL    RDW 13.4 10.0 - 15.0 %    Platelet Count 122 (L) 150 - 450 10e3/uL    % Neutrophils 78 %    % Lymphocytes 10 %    % Monocytes 11 %    % Eosinophils 1 %    % Basophils 0 %    % Immature Granulocytes 1 %    NRBCs per 100 WBC 0 <1 /100    Absolute Neutrophils 6.7 1.6 - 8.3 10e3/uL    Absolute Lymphocytes 0.9 0.8 - 5.3 10e3/uL    Absolute Monocytes 0.9 0.0 - 1.3 10e3/uL    Absolute Eosinophils 0.1 0.0 - 0.7 10e3/uL    Absolute Basophils 0.0 0.0 - 0.2 10e3/uL    Absolute Immature Granulocytes 0.1 <=0.4 10e3/uL    Absolute NRBCs 0.0 10e3/uL   Basic metabolic panel     Status: Abnormal   Result Value Ref Range    Sodium 140 135 - 145 mmol/L    Potassium 4.0 3.4 - 5.3 mmol/L    Chloride 104 98 - 107 mmol/L    Carbon Dioxide (CO2) 26 22 - 29 mmol/L    Anion Gap 10 7 - 15 mmol/L    Urea Nitrogen 27.1 (H) 8.0 - 23.0 mg/dL    Creatinine 0.97 0.67 - 1.17 mg/dL    GFR Estimate 75 >60 mL/min/1.73m2    Calcium 8.5 (L) 8.8 - 10.4 mg/dL    Glucose 100 (H) 70 - 99 mg/dL   CBC with platelets and differential     Status: Abnormal   Result Value Ref Range    WBC Count 6.8 4.0 - 11.0 10e3/uL    RBC Count 3.13 (L) 4.40 - 5.90 10e6/uL    Hemoglobin 10.1 (L) 13.3 - 17.7 g/dL    Hematocrit 31.5 (L) 40.0 - 53.0 %      (H) 78 - 100 fL    MCH 32.3 26.5 - 33.0 pg    MCHC 32.1 31.5 - 36.5 g/dL    RDW 13.2 10.0 - 15.0 %    Platelet Count 134 (L) 150 - 450 10e3/uL    % Neutrophils 72 %    % Lymphocytes 15 %    % Monocytes 12 %    % Eosinophils 2 %    % Basophils 0 %    % Immature Granulocytes 0 %    NRBCs per 100 WBC 0 <1 /100    Absolute Neutrophils 4.9 1.6 - 8.3 10e3/uL    Absolute Lymphocytes 1.0 0.8 - 5.3 10e3/uL    Absolute Monocytes 0.8 0.0 - 1.3 10e3/uL    Absolute Eosinophils 0.1 0.0 - 0.7 10e3/uL    Absolute Basophils 0.0 0.0 - 0.2 10e3/uL    Absolute Immature Granulocytes 0.0 <=0.4 10e3/uL    Absolute NRBCs 0.0 10e3/uL   EKG 12-lead, tracing only     Status: None   Result Value Ref Range    Systolic Blood Pressure  mmHg    Diastolic Blood Pressure  mmHg    Ventricular Rate 81 BPM    Atrial Rate 81 BPM    AR Interval 218 ms    QRS Duration 106 ms     ms    QTc 455 ms    P Axis 70 degrees    R AXIS 7 degrees    T Axis 184 degrees    Interpretation ECG       Atrial-sensed ventricular-paced rhythm with prolonged AV conduction with occasional Premature ventricular complexes  Abnormal ECG  When compared with ECG of 01-Mar-2021 11:59,  Electronic ventricular pacemaker has replaced Sinus rhythm  Unconfirmed report - interpretation of this ECG is computer generated - see medical record for final interpretation  Confirmed by - EMERGENCY ROOM, PHYSICIAN ([id number]),  Cole Brannon ([id number]) on 9/3/2024 1:26:32 PM     Blood Culture Arm, Right     Status: Normal (Preliminary result)    Specimen: Arm, Right; Blood   Result Value Ref Range    Culture No growth after 2 days    Blood Culture Hand, Right     Status: Normal (Preliminary result)    Specimen: Hand, Right; Blood   Result Value Ref Range    Culture No growth after 2 days     Narrative    Only an Aerobic Blood Culture Bottle was collected, interpret results with caution.   CBC + differential     Status: Abnormal    Narrative    The following orders were created for  panel order CBC + differential.  Procedure                               Abnormality         Status                     ---------                               -----------         ------                     CBC with platelets and d...[[id number]]  Abnormal            Final result               Manual Differential[[id number]]          Abnormal            Final result                 Please view results for these tests on the individual orders.   CBC with platelets differential     Status: Abnormal    Narrative    The following orders were created for panel order CBC with platelets differential.  Procedure                               Abnormality         Status                     ---------                               -----------         ------                     CBC with platelets and d...[[id number]]  Abnormal            Final result               Manual Differential[[id number]]          Abnormal            Final result                 Please view results for these tests on the individual orders.   CBC with Platelets & Differential     Status: Abnormal    Narrative    The following orders were created for panel order CBC with Platelets & Differential.  Procedure                               Abnormality         Status                     ---------                               -----------         ------                     CBC with platelets and d...[[id number]]  Abnormal            Final result               RBC and Platelet Morphology[[id number]]                                                   Please view results for these tests on the individual orders.   CBC with Platelets & Differential     Status: Abnormal    Narrative    The following orders were created for panel order CBC with Platelets & Differential.  Procedure                               Abnormality         Status                     ---------                               -----------         ------                     CBC with platelets and  d...[[id number]]  Abnormal            Final result               RBC and Platelet Morphology[[id number]]                                                   Please view results for these tests on the individual orders.       Tavia Burgess PA-C

## 2024-09-06 NOTE — PLAN OF CARE
Goal Outcome Evaluation:      Plan of Care Reviewed With: patient    Overall Patient Progress: improvingOverall Patient Progress: improving    Outcome Evaluation: Pt discharging to home with resumption of HC RN/PT/OT/HHA through [facility]

## 2024-09-06 NOTE — PLAN OF CARE
"19:00-07:30    Alert and oriented. Complained of leg pain, PRN Tylenol and Oxycodone with good effect as patient managed to sleep afterwards. On IV antibiotics Q 8H. Lymphedema wraps on BLE in place. PT and SW following, plan is home with [facility] RN/PT/OT/HHA.    Goal Outcome Evaluation:      Plan of Care Reviewed With: patient    Overall Patient Progress: improvingOverall Patient Progress: improving    Outcome Evaluation: feeling better      Problem: Adult Inpatient Plan of Care  Goal: Plan of Care Review  Description: The Plan of Care Review/Shift note should be completed every shift.  The Outcome Evaluation is a brief statement about your assessment that the patient is improving, declining, or no change.  This information will be displayed automatically on your shift  note.  Outcome: Progressing  Flowsheets (Taken 9/6/2024 0642)  Outcome Evaluation: feeling better  Plan of Care Reviewed With: patient  Overall Patient Progress: improving  Goal: Patient-Specific Goal (Individualized)  Description: You can add care plan individualizations to a care plan. Examples of Individualization might be:  \"Parent requests to be called daily at 9am for status\", \"I have a hard time hearing out of my right ear\", or \"Do not touch me to wake me up as it startles  me\".  Outcome: Progressing  Goal: Absence of Hospital-Acquired Illness or Injury  Outcome: Progressing  Intervention: Identify and Manage Fall Risk  Recent Flowsheet Documentation  Taken 9/5/2024 2103 by Peggy Anne RN  Safety Promotion/Fall Prevention: activity supervised  Intervention: Prevent Skin Injury  Recent Flowsheet Documentation  Taken 9/6/2024 0206 by Peggy Anne RN  Body Position: position changed independently  Taken 9/5/2024 2103 by Peggy Anne RN  Skin Protection: adhesive use limited  Device Skin Pressure Protection: absorbent pad utilized/changed  Taken 9/5/2024 2000 by Peggy Anne RN  Body Position: position changed " independently  Intervention: Prevent and Manage VTE (Venous Thromboembolism) Risk  Recent Flowsheet Documentation  Taken 9/5/2024 2103 by Peggy Anne RN  VTE Prevention/Management: SCDs off (sequential compression devices)  Intervention: Prevent Infection  Recent Flowsheet Documentation  Taken 9/5/2024 2103 by Peggy Anne RN  Infection Prevention:   rest/sleep promoted   hand hygiene promoted  Goal: Optimal Comfort and Wellbeing  Outcome: Progressing  Intervention: Monitor Pain and Promote Comfort  Recent Flowsheet Documentation  Taken 9/5/2024 1956 by Peggy Anne RN  Pain Management Interventions: medication (see MAR)  Goal: Readiness for Transition of Care  Outcome: Progressing     Problem: Fall Injury Risk  Goal: Absence of Fall and Fall-Related Injury  Outcome: Progressing  Intervention: Identify and Manage Contributors  Recent Flowsheet Documentation  Taken 9/5/2024 2103 by Peggy Anne RN  Medication Review/Management: medications reviewed  Intervention: Promote Injury-Free Environment  Recent Flowsheet Documentation  Taken 9/5/2024 2103 by Peggy Anne RN  Safety Promotion/Fall Prevention: activity supervised     Problem: Pain Acute  Goal: Optimal Pain Control and Function  Outcome: Progressing  Intervention: Develop Pain Management Plan  Recent Flowsheet Documentation  Taken 9/5/2024 1956 by Peggy Anne RN  Pain Management Interventions: medication (see MAR)  Intervention: Prevent or Manage Pain  Recent Flowsheet Documentation  Taken 9/5/2024 2103 by Peggy Anne RN  Medication Review/Management: medications reviewed

## 2024-09-06 NOTE — PLAN OF CARE
"  Goal Outcome Evaluation:      Plan of Care Reviewed With: patient    Overall Patient Progress: improvingOverall Patient Progress: improving    Outcome Evaluation: Feeling better today, Denies pain. BLE wrapped for lymphedema. Up as SBA. Hoping to discharge home today Voiding spontaneously, no difficulty. LBM yesterday evening. [place] [facility] but able to make needs known. PIV - S/L, received morning dose of IV Ancef with no concerns. PT following, lymph wraps to be re-done. Plan: Possible discharge today.      Problem: Adult Inpatient Plan of Care  Goal: Plan of Care Review  Description: The Plan of Care Review/Shift note should be completed every shift.  The Outcome Evaluation is a brief statement about your assessment that the patient is improving, declining, or no change.  This information will be displayed automatically on your shift  note.  Outcome: Progressing  Flowsheets (Taken 9/6/2024 1126)  Outcome Evaluation: Feeling better today, Denies pain. BLE wrapped for lymphedema. Up as SBA. Hoping to discharge home today  Plan of Care Reviewed With: patient  Overall Patient Progress: improving  Goal: Patient-Specific Goal (Individualized)  Description: You can add care plan individualizations to a care plan. Examples of Individualization might be:  \"Parent requests to be called daily at 9am for status\", \"I have a hard time hearing out of my right ear\", or \"Do not touch me to wake me up as it startles  me\".  Outcome: Progressing  Goal: Absence of Hospital-Acquired Illness or Injury  Outcome: Progressing  Intervention: Prevent Infection  Recent Flowsheet Documentation  Taken 9/6/2024 0921 by Swapna Block RN  Infection Prevention:   rest/sleep promoted   hand hygiene promoted   equipment surfaces disinfected   single patient room provided  Goal: Optimal Comfort and Wellbeing  Outcome: Progressing  Goal: Readiness for Transition of Care  Outcome: Progressing     Problem: Pain Acute  Goal: Optimal Pain Control and " Function  Outcome: Progressing

## 2024-09-06 NOTE — PROGRESS NOTES
SHIFT SUMMARY  [id number]  OUTPATIENT/OBSERVATION GOALS TO BE MET BEFORE DISCHARGE:  1. Pain Status: Improved-controlled with oral pain medications.    2. Return to near baseline physical activity: Yes    3. Cleared for discharge by consultants (if involved): No    Discharge Planner Nurse   Safe discharge environment identified: No  Barriers to discharge: Yes       Entered by: Bertha Montejo RN 09/05/2024 7:09 PM     VSS,afeb this shift.Worked with PT today and did well.Has been independent in room this afternoon. States he is pain free this aftertnoon and evening, did c/o leg cellulitis pain this AM, received 2.5 mg Oxy and Tylenol this morning,expressed good relief. A & O x4 and is engaging and conversant.L LE open areas covered with Mepilex and sites are to be cleaned and mepilex changed Q 3 days.Good appetite, visitors in to see pt. This evening.Remains on IV Ancef, tolerating well. Will con't to monitor and prep for impending DC soon.      Please review provider order for any additional goals.   Nurse to notify provider when observation goals have been met and patient is ready for discharge.

## 2024-09-07 NOTE — PLAN OF CARE
Physical Therapy Discharge Summary    Reason for therapy discharge:    Discharged to home with home therapy.    Progress towards therapy goal(s). See goals on Care Plan in [facility] electronic health record for goal details.  Goals partially met.  Barriers to achieving goals:   discharge from facility.    Therapy recommendation(s):    Continued therapy is recommended.  Rationale/Recommendations:  Pt mobilizing well,SBA/mod ind with all activity during session this date. Pt would benefit from continued f/u with OP PT, lymphedema services.

## 2024-09-08 LAB
BACTERIA BLD CULT: NO GROWTH
BACTERIA BLD CULT: NO GROWTH